# Patient Record
Sex: MALE | Race: WHITE | NOT HISPANIC OR LATINO | Employment: UNEMPLOYED | URBAN - METROPOLITAN AREA
[De-identification: names, ages, dates, MRNs, and addresses within clinical notes are randomized per-mention and may not be internally consistent; named-entity substitution may affect disease eponyms.]

---

## 2019-07-19 PROBLEM — F90.2 ATTENTION DEFICIT HYPERACTIVITY DISORDER (ADHD), COMBINED TYPE: Status: ACTIVE | Noted: 2017-05-16

## 2019-07-19 RX ORDER — DEXTROAMPHETAMINE SULFATE 10 MG/1
10 TABLET ORAL DAILY
Qty: 30 TABLET | Refills: 0 | Status: SHIPPED | OUTPATIENT
Start: 2019-07-19 | End: 2019-08-16 | Stop reason: SDUPTHER

## 2019-07-19 RX ORDER — DEXTROAMPHETAMINE SULFATE 10 MG/1
TABLET ORAL
COMMUNITY
Start: 2017-06-08 | End: 2019-07-19 | Stop reason: SDUPTHER

## 2019-08-18 RX ORDER — DEXTROAMPHETAMINE SULFATE 10 MG/1
10 TABLET ORAL DAILY
Qty: 30 TABLET | Refills: 0 | Status: SHIPPED | OUTPATIENT
Start: 2019-08-18 | End: 2019-08-28 | Stop reason: SDUPTHER

## 2019-08-28 ENCOUNTER — OFFICE VISIT (OUTPATIENT)
Dept: PSYCHIATRY | Facility: CLINIC | Age: 29
End: 2019-08-28

## 2019-08-28 DIAGNOSIS — F90.2 ATTENTION DEFICIT HYPERACTIVITY DISORDER (ADHD), COMBINED TYPE: Primary | ICD-10-CM

## 2019-08-28 PROCEDURE — 99212 OFFICE O/P EST SF 10 MIN: CPT | Performed by: PSYCHIATRY & NEUROLOGY

## 2019-08-28 RX ORDER — DEXTROAMPHETAMINE SULFATE 10 MG/1
10 TABLET ORAL DAILY
Qty: 30 TABLET | Refills: 0 | Status: SHIPPED | OUTPATIENT
Start: 2019-08-28 | End: 2019-10-14 | Stop reason: SDUPTHER

## 2019-08-28 NOTE — PROGRESS NOTES
Subjective   Kan Saavedra is a 29 y.o. male who presents today for follow up    Chief Complaint:  Decreased concentration without meds     History of Present Illness:   Long term issues with adhd, was stable on meds  On meds he is able to stay focused and finish the project,   meds last long enough   Mood is stable, denied feeling hopeless/helpless, denied AVH  No anxiety related to meds  He is taking not every day, able to get drug holidays     The following portions of the patient's history were reviewed and updated as appropriate: allergies, current medications, past family history, past medical history, past social history, past surgical history and problem list.  PAST PSYCHIATRIC HISTORY      Previous Psychiatric Diagnoses   Axis I: Attention Deficit Disorder     Past Hospitalizations or Residential Treatment   Locations\Providers: none      Past Outpatient Treatment   Diagnosis Treated: Affective Disorder, Anxiety/Panic Dis.  Treatment Type: Medication Management  Location: in school - psych      Prior Psychiatric Medications   Comments: ritalin since 2 grade, then taken off due to side effects      concerta - no difference      adderall - was rxd but did nto take it     viibryd - made numb      vyvanse was effective      Consequences of Mental Disorder   Consequences: family disruption, emotional distress     SOCIAL HISTORY   Single  Number of marriages: 0  Number of children: 1  Current Relationship is: unstable  Family of Origin is: supportive     Current Living Situation   Lives with: children, significant other     Education   Level: some college     Employment   Job Status: full-time     Hobbies and Leisure Activities   Activity Type: quiet activities     Alcohol use   Freq. drinking: <1  Smoking History   Smoking Hx: Current every day smoker  Pack per day: 1     Exercise   Exercise sessions (per wk): 2     Illicit Drug Use   Illicit Drugs used: none     FAMILY HISTORY OF MENTAL DISORDERS   fh  Grandparents: Non-contributory  fh Mother: Affective Disorders  fh Father: Non-contributory  fh Siblings: Non-contributory  fh Other: Non-contributory             Interval History  No Change    Side Effects  Denied       Past Medical History:  Past Medical History:   Diagnosis Date   • ADHD (attention deficit hyperactivity disorder)        Past Surgical History:  History reviewed. No pertinent surgical history.    Problem List:  Patient Active Problem List   Diagnosis   • Attention deficit hyperactivity disorder (ADHD), combined type       Allergy:   No Known Allergies     Discontinued Medications:  Medications Discontinued During This Encounter   Medication Reason   • dextroamphetamine (DEXTROSTAT) 10 MG tablet Reorder   • Lisdexamfetamine Dimesylate (VYVANSE) 60 MG chewable tablet Reorder       Current Medications:   Current Outpatient Medications   Medication Sig Dispense Refill   • dextroamphetamine (DEXTROSTAT) 10 MG tablet Take 1 tablet by mouth Daily. 30 tablet 0   • Lisdexamfetamine Dimesylate (VYVANSE) 60 MG chewable tablet Chew 60 mg Daily 30 tablet 0     No current facility-administered medications for this visit.          Review of Symptoms:    Psychiatric/Behavioral: Negative for agitation, behavioral problems, confusion, decreased concentration, dysphoric mood, hallucinations, self-injury, sleep disturbance and suicidal ideas. The patient is not nervous/anxious and is not hyperactive.        Physical Exam:   There were no vitals taken for this visit.    Mental Status Exam:   Hygiene:   good  Cooperation:  Cooperative  Eye Contact:  Good  Psychomotor Behavior:  Appropriate  Affect:  Full range  Mood: euthymic  Hopelessness: Denies  Speech:  Normal  Thought Process:  Goal directed and Linear  Thought Content:  Normal  Suicidal:  None  Homicidal:  None  Hallucinations:  None  Delusion:  None  Memory:  Intact  Orientation:  Person, Place, Time and Situation  Reliability:  good  Insight:  Good  Judgement:   Good  Impulse Control:  Fair  Physical/Medical Issues:  No        PHQ-9 Depression Screening  Little interest or pleasure in doing things? 1   Feeling down, depressed, or hopeless? 1   Trouble falling or staying asleep, or sleeping too much? 0   Feeling tired or having little energy? 2   Poor appetite or overeating? 0   Feeling bad about yourself - or that you are a failure or have let yourself or your family down? 1   Trouble concentrating on things, such as reading the newspaper or watching television? 2   Moving or speaking so slowly that other people could have noticed? Or the opposite - being so fidgety or restless that you have been moving around a lot more than usual? 0   Thoughts that you would be better off dead, or of hurting yourself in some way? 0   PHQ-9 Total Score 7   If you checked off any problems, how difficult have these problems made it for you to do your work, take care of things at home, or get along with other people? Very difficult           Former smoker    I advised Kan of the risks of tobacco use.     Lab Results:   No visits with results within 3 Month(s) from this visit.   Latest known visit with results is:   No results found for any previous visit.       Assessment/Plan   Problems Addressed this Visit        Other    Attention deficit hyperactivity disorder (ADHD), combined type - Primary    Relevant Medications    dextroamphetamine (DEXTROSTAT) 10 MG tablet    Lisdexamfetamine Dimesylate (VYVANSE) 60 MG chewable tablet          Visit Diagnoses:    ICD-10-CM ICD-9-CM   1. Attention deficit hyperactivity disorder (ADHD), combined type F90.2 314.01       TREATMENT PLAN/GOALS: Continue supportive psychotherapy efforts and medications as indicated. Treatment and medication options discussed during today's visit. Patient ackowledged and verbally consented to continue with current treatment plan and was educated on the importance of compliance with treatment and follow-up  appointments.    MEDICATION ISSUES:  Cont current meds    INSPECT reviewed as expected (last fill on 8/19/19)   Discussed medication options and treatment plan of prescribed medication as well as the risks, benefits, and side effects including potential falls, possible impaired driving and metabolic adversities among others. Patient is agreeable to call the office with any worsening of symptoms or onset of side effects. Patient is agreeable to call 911 or go to the nearest ER should he/she begin having SI/HI. No medication side effects or related complaints today.     MEDS ORDERED DURING VISIT:  New Medications Ordered This Visit   Medications   • dextroamphetamine (DEXTROSTAT) 10 MG tablet     Sig: Take 1 tablet by mouth Daily.     Dispense:  30 tablet     Refill:  0     Dispense on or after Sept 16, 2019   • Lisdexamfetamine Dimesylate (VYVANSE) 60 MG chewable tablet     Sig: Chew 60 mg Daily     Dispense:  30 tablet     Refill:  0     Please dispense on or after Sept 16,2019       Return in about 3 months (around 11/28/2019).         This document has been electronically signed by Hafsa Oneal MD  August 28, 2019 4:20 PM

## 2019-08-28 NOTE — PATIENT INSTRUCTIONS
Attention Deficit Hyperactivity Disorder, Adult  Attention deficit hyperactivity disorder (ADHD) is a mental health disorder that starts during childhood. For many people with ADHD, the disorder continues into adult years. There are many things that you and your health care provider or therapist (mental health professional) can do to manage your symptoms.  What are the causes?  The exact cause of ADHD is not known.  What increases the risk?  You are more likely to develop this condition if:  · You have a family history of ADHD.  · You are male.  · You were born to a mother who smoked or drank alcohol during pregnancy.  · You were exposed to lead poisoning or other toxins in the womb or in early life.  · You were born before 37 weeks of pregnancy (prematurely) or you had a low birth weight.  · You have experienced a brain injury.  What are the signs or symptoms?  Symptoms of this condition depend on the type of ADHD. The two main types are inattentive and hyperactive-impulsive. Some people may have symptoms of both types.  Symptoms of the inattentive type include:  · Difficulty watching, listening, or thinking with focused effort (paying attention).  · Making careless mistakes.  · Not listening.  · Not following instructions.  · Being disorganized.  · Avoiding tasks that require time and attention.  · Losing things.  · Forgetting things.  · Being easily distracted.  Symptoms of the hyperactive-impulsive type include:  · Restlessness.  · Talking too much.  · Interrupting.  · Difficulty with:  ? Sitting still.  ? Staying quiet.  ? Feeling motivated.  ? Relaxing.  ? Waiting in line or waiting for a turn.  How is this diagnosed?  This condition is diagnosed based on your current symptoms and your history of symptoms. The diagnosis can be made by a provider such as a primary care provider, psychiatrist, psychologist, or clinical . The provider may use a symptom checklist or a standardized behavior rating  scale to evaluate your symptoms. He or she may want to talk with family members who have known you for a long time and have observed your behaviors.  There are no lab tests or brain imaging tests that can diagnose ADHD.  How is this treated?  This condition can be treated with medicines and behavior therapy. Medicines may be the best option to reduce impulsive behaviors and improve attention. Your health care provider may recommend:  · Stimulant medicines. These are the most common medicines used for adult ADHD. They affect certain chemicals in the brain (neurotransmitters). These medicines may be long-acting or short-acting. This will determine how often you need to take the medicine.  · A non-stimulant medicine for adult ADHD (atomoxetine). This medicine increases a neurotransmitter called norepinephrine. It may take weeks to months to see effects from this medicine.  Psychotherapy and behavioral management are also important for treating ADHD. Psychotherapy is often used along with medicine. Your health care provider may suggest:  · Cognitive behavioral therapy (CBT). This type of therapy teaches you to replace negative thoughts and actions with positive thoughts and actions. When used as part of ADHD treatment, this therapy may also include:  ? Coping strategies for organization, time management, impulse control, and stress reduction.  ? Mindfulness and meditation training.  · Behavioral management. This may include strategies for organization and time management. You may work with an ADHD  who is specially trained to help people with ADHD to manage and organize activities and to function more effectively.  Follow these instructions at home:  Medicines    · Take over-the-counter and prescription medicines only as told by your health care provider.  · Talk with your health care provider about the possible side effects of your medicine to watch for.  General instructions    · Learn as much as you can about  adult ADHD, and work closely with your health care providers to find the treatments that work best for you.  · Do not use drugs or abuse alcohol. Limit alcohol intake to no more than 1 drink a day for nonpregnant women and 2 drinks a day for men. One drink equals 12 oz of beer, 5 oz of wine, or 1½ oz of hard liquor.  · Follow the same schedule each day. Make sure your schedule includes enough time for you to get plenty of sleep.  · Use reminder devices like notes, calendars, and phone apps to stay on-time and organized.  · Eat a healthy diet. Do not skip meals.  · Exercise regularly. Exercise can help to reduce stress and anxiety.  · Keep all follow-up visits as told by your health care provider and therapist. This is important.  Where to find more information  · A health care provider may be able to recommend resources that are available online or over the phone. You could start with:  ? Attention Deficit Disorder Association (ADDA): www.add.org  ? [employer] [place] [employer] (NIMH): www.nimh.nih.gov  Contact a health care provider if:  · Your symptoms are changing, getting worse, or not improving.  · You have side effects from your medicine, such as:  ? Repeated muscle twitches, coughing, or speech outbursts.  ? Sleep problems.  ? Loss of appetite.  ? Depression.  ? New or worsening behavior problems.  ? Dizziness.  ? Unusually fast heartbeat.  ? Stomach pains.  ? Headaches.  · You are struggling with anxiety, depression, or substance abuse.  Get help right away if:  · You have a severe reaction to a medicine.  · You have thoughts of hurting yourself or others.  If you ever feel like you may hurt yourself or others, or have thoughts about taking your own life, get help right away. You can go to the nearest emergency department or call:  · Your local emergency services (911 in the [place]  · A suicide crisis helpline, such as the National Suicide Prevention Lifeline at 1-153.868.8504. This is open 24 hours a  day.  Summary  · ADHD is a mental health disorder that starts during childhood and often continues into adult years.  · The exact cause of ADHD is not known.  · There is no cure for ADHD, but treatment with medicine, therapy, or behavioral training can help you manage your condition.  This information is not intended to replace advice given to you by your health care provider. Make sure you discuss any questions you have with your health care provider.  Document Released: 08/09/2018 Document Revised: 08/09/2018 Document Reviewed: 08/09/2018  [employer] Patient Education © 2019 [employer]

## 2019-10-14 DIAGNOSIS — F90.2 ATTENTION DEFICIT HYPERACTIVITY DISORDER (ADHD), COMBINED TYPE: ICD-10-CM

## 2019-10-15 RX ORDER — DEXTROAMPHETAMINE SULFATE 10 MG/1
10 TABLET ORAL DAILY
Qty: 30 TABLET | Refills: 0 | Status: SHIPPED | OUTPATIENT
Start: 2019-10-15 | End: 2019-11-12 | Stop reason: SDUPTHER

## 2019-11-12 DIAGNOSIS — F90.2 ATTENTION DEFICIT HYPERACTIVITY DISORDER (ADHD), COMBINED TYPE: ICD-10-CM

## 2019-11-13 RX ORDER — DEXTROAMPHETAMINE SULFATE 10 MG/1
10 TABLET ORAL DAILY
Qty: 30 TABLET | Refills: 0 | Status: SHIPPED | OUTPATIENT
Start: 2019-11-13 | End: 2019-12-03 | Stop reason: SDUPTHER

## 2019-12-03 ENCOUNTER — OFFICE VISIT (OUTPATIENT)
Dept: PSYCHIATRY | Facility: CLINIC | Age: 29
End: 2019-12-03

## 2019-12-03 DIAGNOSIS — F90.2 ATTENTION DEFICIT HYPERACTIVITY DISORDER (ADHD), COMBINED TYPE: Primary | ICD-10-CM

## 2019-12-03 PROCEDURE — 99213 OFFICE O/P EST LOW 20 MIN: CPT | Performed by: PSYCHIATRY & NEUROLOGY

## 2019-12-03 RX ORDER — DEXTROAMPHETAMINE SULFATE 10 MG/1
10 TABLET ORAL DAILY
Qty: 30 TABLET | Refills: 0 | Status: SHIPPED | OUTPATIENT
Start: 2019-12-03 | End: 2020-01-08 | Stop reason: SDUPTHER

## 2019-12-03 NOTE — PROGRESS NOTES
Subjective   Kan Saavedra is a 29 y.o. male who presents today for follow up    Chief Complaint:  Decreased concentration without meds      History of Present Illness:   The patient was diagnosed with ADHD when he was a child, he was stable on medications,  When on medications he is able to stay focused to finish the project   mood is stable, denied feeling hopeless/helpless, denied AVH  No anxiety related to meds  He is taking medications not every day, able to get drug holidays, did not report increased tolerance    The following portions of the patient's history were reviewed and updated as appropriate: allergies, current medications, past family history, past medical history, past social history, past surgical history and problem list.  PAST PSYCHIATRIC HISTORY      Previous Psychiatric Diagnoses   Axis I: Attention Deficit Disorder     Past Hospitalizations or Residential Treatment   Locations\Providers: none      Past Outpatient Treatment   Diagnosis Treated: Affective Disorder, Anxiety/Panic Dis.  Treatment Type: Medication Management  Location: in school - psych      Prior Psychiatric Medications   Comments: ritalin since 2 grade, then taken off due to side effects      concerta - no difference      adderall - was rxd but did nto take it     viibryd - made numb      vyvanse was effective      Consequences of Mental Disorder   Consequences: family disruption, emotional distress     SOCIAL HISTORY   Single  Number of marriages: 0  Number of children: 1  Current Relationship is: unstable  Family of Origin is: supportive     Current Living Situation   Lives with: children, significant other     Education   Level: some college     Employment   Job Status: full-time     Hobbies and Leisure Activities   Activity Type: quiet activities     Alcohol use   Freq. drinking: <1  Smoking History   Smoking Hx: Current every day smoker  Pack per day: 1     Exercise   Exercise sessions (per wk): 2     Illicit Drug Use    Illicit Drugs used: none     FAMILY HISTORY OF MENTAL DISORDERS   fh Grandparents: Non-contributory  fh Mother: Affective Disorders  fh Father: Non-contributory  fh Siblings: Non-contributory  fh Other: Non-contributory             Interval History  No Change, stable     Side Effects  Denied       Past Medical History:  Past Medical History:   Diagnosis Date   • ADHD (attention deficit hyperactivity disorder)        Past Surgical History:  History reviewed. No pertinent surgical history.    Problem List:  Patient Active Problem List   Diagnosis   • Attention deficit hyperactivity disorder (ADHD), combined type       Allergy:   No Known Allergies     Discontinued Medications:  There are no discontinued medications.    Current Medications:   Current Outpatient Medications   Medication Sig Dispense Refill   • dextroamphetamine (DEXTROSTAT) 10 MG tablet Take 1 tablet by mouth Daily. 30 tablet 0   • Lisdexamfetamine Dimesylate (VYVANSE) 60 MG chewable tablet Chew 60 mg Daily 30 tablet 0     No current facility-administered medications for this visit.          Review of Symptoms:    Psychiatric/Behavioral: Negative for agitation, behavioral problems, confusion, decreased concentration, dysphoric mood, hallucinations, self-injury, sleep disturbance and suicidal ideas.   The patient is not nervous/anxious and is not hyperactive.        Physical Exam:   There were no vitals taken for this visit.    Mental Status Exam:   Hygiene:   good  Cooperation:  Cooperative  Eye Contact:  Good  Psychomotor Behavior:  Appropriate  Affect:  Blunted congruent with mood   Mood: euthymic  Hopelessness: Denies  Speech:  Normal  Thought Process:  Goal directed and Linear  Thought Content:  Normal  Suicidal:  None  Homicidal:  None  Hallucinations:  None  Delusion:  None  Memory:  Intact  Orientation:  Person, Place, Time and Situation  Reliability:  good  Insight:  Good  Judgement:  Good  Impulse Control:  Fair  Physical/Medical Issues:  No       MSE reviewed and accepted with changes     PHQ-9 Depression Screening  Little interest or pleasure in doing things? 1   Feeling down, depressed, or hopeless? 1   Trouble falling or staying asleep, or sleeping too much? 0   Feeling tired or having little energy? 2   Poor appetite or overeating? 0   Feeling bad about yourself - or that you are a failure or have let yourself or your family down? 0   Trouble concentrating on things, such as reading the newspaper or watching television? 2   Moving or speaking so slowly that other people could have noticed? Or the opposite - being so fidgety or restless that you have been moving around a lot more than usual? 0   Thoughts that you would be better off dead, or of hurting yourself in some way? 0   PHQ-9 Total Score 6   If you checked off any problems, how difficult have these problems made it for you to do your work, take care of things at home, or get along with other people? Very difficult           Former smoker    I advised Kan of the risks of tobacco use.     Lab Results:   No visits with results within 3 Month(s) from this visit.   Latest known visit with results is:   No results found for any previous visit.       Assessment/Plan   Problems Addressed this Visit        Other    Attention deficit hyperactivity disorder (ADHD), combined type - Primary          Visit Diagnoses:    ICD-10-CM ICD-9-CM   1. Attention deficit hyperactivity disorder (ADHD), combined type F90.2 314.01       TREATMENT PLAN/GOALS: Continue supportive psychotherapy efforts and medications as indicated. Treatment and medication options discussed during today's visit. Patient ackowledged and verbally consented to continue with current treatment plan and was educated on the importance of compliance with treatment and follow-up appointments.    MEDICATION ISSUES:  Cont current meds, stable on meds, no side effects , weight stable     INSPECT reviewed as expected  - last filled on 11/13/19    Discussed medication options and treatment plan of prescribed medication as well as the risks, benefits, and side effects including potential falls, possible impaired driving and metabolic adversities among others. Patient is agreeable to call the office with any worsening of symptoms or onset of side effects. Patient is agreeable to call 911 or go to the nearest ER should he/she begin having SI/HI. No medication side effects or related complaints today.     MEDS ORDERED DURING VISIT:  No orders of the defined types were placed in this encounter.      Return in about 4 years (around 12/3/2023).         This document has been electronically signed by Hafsa Oneal MD  December 3, 2019 4:14 PM

## 2019-12-03 NOTE — PATIENT INSTRUCTIONS
Attention Deficit Hyperactivity Disorder, Adult  Attention deficit hyperactivity disorder (ADHD) is a mental health disorder that starts during childhood. For many people with ADHD, the disorder continues into adult years. There are many things that you and your health care provider or therapist (mental health professional) can do to manage your symptoms.  What are the causes?  The exact cause of ADHD is not known.  What increases the risk?  You are more likely to develop this condition if:  · You have a family history of ADHD.  · You are male.  · You were born to a mother who smoked or drank alcohol during pregnancy.  · You were exposed to lead poisoning or other toxins in the womb or in early life.  · You were born before 37 weeks of pregnancy (prematurely) or you had a low birth weight.  · You have experienced a brain injury.  What are the signs or symptoms?  Symptoms of this condition depend on the type of ADHD. The two main types are inattentive and hyperactive-impulsive. Some people may have symptoms of both types.  Symptoms of the inattentive type include:  · Difficulty watching, listening, or thinking with focused effort (paying attention).  · Making careless mistakes.  · Not listening.  · Not following instructions.  · Being disorganized.  · Avoiding tasks that require time and attention.  · Losing things.  · Forgetting things.  · Being easily distracted.  Symptoms of the hyperactive-impulsive type include:  · Restlessness.  · Talking too much.  · Interrupting.  · Difficulty with:  ? Sitting still.  ? Staying quiet.  ? Feeling motivated.  ? Relaxing.  ? Waiting in line or waiting for a turn.  How is this diagnosed?  This condition is diagnosed based on your current symptoms and your history of symptoms. The diagnosis can be made by a provider such as a primary care provider, psychiatrist, psychologist, or clinical . The provider may use a symptom checklist or a standardized behavior rating  scale to evaluate your symptoms. He or she may want to talk with family members who have known you for a long time and have observed your behaviors.  There are no lab tests or brain imaging tests that can diagnose ADHD.  How is this treated?  This condition can be treated with medicines and behavior therapy. Medicines may be the best option to reduce impulsive behaviors and improve attention. Your health care provider may recommend:  · Stimulant medicines. These are the most common medicines used for adult ADHD. They affect certain chemicals in the brain (neurotransmitters). These medicines may be long-acting or short-acting. This will determine how often you need to take the medicine.  · A non-stimulant medicine for adult ADHD (atomoxetine). This medicine increases a neurotransmitter called norepinephrine. It may take weeks to months to see effects from this medicine.  Psychotherapy and behavioral management are also important for treating ADHD. Psychotherapy is often used along with medicine. Your health care provider may suggest:  · Cognitive behavioral therapy (CBT). This type of therapy teaches you to replace negative thoughts and actions with positive thoughts and actions. When used as part of ADHD treatment, this therapy may also include:  ? Coping strategies for organization, time management, impulse control, and stress reduction.  ? Mindfulness and meditation training.  · Behavioral management. This may include strategies for organization and time management. You may work with an ADHD  who is specially trained to help people with ADHD to manage and organize activities and to function more effectively.  Follow these instructions at home:  Medicines    · Take over-the-counter and prescription medicines only as told by your health care provider.  · Talk with your health care provider about the possible side effects of your medicine to watch for.  General instructions    · Learn as much as you can about  adult ADHD, and work closely with your health care providers to find the treatments that work best for you.  · Do not use drugs or abuse alcohol. Limit alcohol intake to no more than 1 drink a day for nonpregnant women and 2 drinks a day for men. One drink equals 12 oz of beer, 5 oz of wine, or 1½ oz of hard liquor.  · Follow the same schedule each day. Make sure your schedule includes enough time for you to get plenty of sleep.  · Use reminder devices like notes, calendars, and phone apps to stay on-time and organized.  · Eat a healthy diet. Do not skip meals.  · Exercise regularly. Exercise can help to reduce stress and anxiety.  · Keep all follow-up visits as told by your health care provider and therapist. This is important.  Where to find more information  · A health care provider may be able to recommend resources that are available online or over the phone. You could start with:  ? Attention Deficit Disorder Association (ADDA): www.add.org  ? [place] of Mental Health (NIMH): www.nimh.nih.gov  Contact a health care provider if:  · Your symptoms are changing, getting worse, or not improving.  · You have side effects from your medicine, such as:  ? Repeated muscle twitches, coughing, or speech outbursts.  ? Sleep problems.  ? Loss of appetite.  ? Depression.  ? New or worsening behavior problems.  ? Dizziness.  ? Unusually fast heartbeat.  ? Stomach pains.  ? Headaches.  · You are struggling with anxiety, depression, or substance abuse.  Get help right away if:  · You have a severe reaction to a medicine.  · You have thoughts of hurting yourself or others.  If you ever feel like you may hurt yourself or others, or have thoughts about taking your own life, get help right away. You can go to the nearest emergency department or call:  · Your local emergency services (911 in the [place]  · A suicide crisis helpline, such as the National Suicide Prevention Lifeline at 1-885.115.2632. This is open 24 hours a  day.  Summary  · ADHD is a mental health disorder that starts during childhood and often continues into adult years.  · The exact cause of ADHD is not known.  · There is no cure for ADHD, but treatment with medicine, therapy, or behavioral training can help you manage your condition.  This information is not intended to replace advice given to you by your health care provider. Make sure you discuss any questions you have with your health care provider.  Document Released: 08/09/2018 Document Revised: 08/09/2018 Document Reviewed: 08/09/2018  [employer] Interactive Patient Education © 2019 [employer]

## 2020-01-08 DIAGNOSIS — F90.2 ATTENTION DEFICIT HYPERACTIVITY DISORDER (ADHD), COMBINED TYPE: ICD-10-CM

## 2020-01-08 RX ORDER — DEXTROAMPHETAMINE SULFATE 10 MG/1
10 TABLET ORAL DAILY
Qty: 30 TABLET | Refills: 0 | Status: SHIPPED | OUTPATIENT
Start: 2020-01-08 | End: 2020-01-17

## 2020-01-09 ENCOUNTER — TELEPHONE (OUTPATIENT)
Dept: PSYCHIATRY | Facility: CLINIC | Age: 30
End: 2020-01-09

## 2020-01-16 ENCOUNTER — TELEPHONE (OUTPATIENT)
Dept: PSYCHIATRY | Facility: CLINIC | Age: 30
End: 2020-01-16

## 2020-01-16 DIAGNOSIS — F90.2 ATTENTION DEFICIT HYPERACTIVITY DISORDER (ADHD), COMBINED TYPE: Primary | ICD-10-CM

## 2020-01-16 NOTE — TELEPHONE ENCOUNTER
PATIENT IS REQUESTING MED CHANGE WITH ADHD MED. NEW INSURANCE WILL NOT PAY FOR VYVANSE.HE IS AFRAID TO WAIT ON APPEAL  PLEASE ADVISE

## 2020-01-17 RX ORDER — DEXTROAMPHETAMINE SACCHARATE, AMPHETAMINE ASPARTATE MONOHYDRATE, DEXTROAMPHETAMINE SULFATE AND AMPHETAMINE SULFATE 5; 5; 5; 5 MG/1; MG/1; MG/1; MG/1
CAPSULE, EXTENDED RELEASE ORAL
Qty: 30 CAPSULE | Refills: 0 | Status: SHIPPED | OUTPATIENT
Start: 2020-01-17 | End: 2020-01-30 | Stop reason: SDUPTHER

## 2020-01-20 ENCOUNTER — TELEPHONE (OUTPATIENT)
Dept: PSYCHIATRY | Facility: CLINIC | Age: 30
End: 2020-01-20

## 2020-01-20 NOTE — TELEPHONE ENCOUNTER
NOTIFIED PHARMACY OF CLARIFICATION ON ADDERALL XR BID WAS SENT IN FOR #30 TO MAKE SURE IT WORKS OK FOR PATIENT

## 2020-01-30 DIAGNOSIS — F90.2 ATTENTION DEFICIT HYPERACTIVITY DISORDER (ADHD), COMBINED TYPE: ICD-10-CM

## 2020-01-31 RX ORDER — DEXTROAMPHETAMINE SACCHARATE, AMPHETAMINE ASPARTATE MONOHYDRATE, DEXTROAMPHETAMINE SULFATE AND AMPHETAMINE SULFATE 5; 5; 5; 5 MG/1; MG/1; MG/1; MG/1
CAPSULE, EXTENDED RELEASE ORAL
Qty: 60 CAPSULE | Refills: 0 | Status: SHIPPED | OUTPATIENT
Start: 2020-01-31 | End: 2020-02-27 | Stop reason: SDUPTHER

## 2020-02-12 ENCOUNTER — TELEPHONE (OUTPATIENT)
Dept: PSYCHIATRY | Facility: CLINIC | Age: 30
End: 2020-02-12

## 2020-02-12 DIAGNOSIS — F90.2 ATTENTION DEFICIT HYPERACTIVITY DISORDER (ADHD), COMBINED TYPE: ICD-10-CM

## 2020-02-12 NOTE — TELEPHONE ENCOUNTER
Patient called regarding amphetamine script needed. The patient said he was taking Vyvanse 60 mg daily and dextroamphetamine 10 mg daily, but his insurance wouldn't cover the Vyvance,so you changed him to Amphetamine - Dextroamphetamine 20 mg in place of the Vyvanse. He said he should still be amphetamine sulfate 10 mg and needs a script sent to his pharmacy. Please advise

## 2020-02-13 NOTE — TELEPHONE ENCOUNTER
He had vyvanse (which is extended release) and adderal immediate release. When he was switched to adderall I put adderall extended release 2 times per day , so he should have longer coverage now and does not need immediate release. Adderall XR in the AM replaced vyvanse and adderall XR in the afternoon replaced immediate release. He has 60 caps per month

## 2020-02-18 NOTE — TELEPHONE ENCOUNTER
Patient notified of MD recommendation and if the medication adjustment given  wasn't sufficient he should contact the office 3 to 4 days prior to his last dose to  request another medication  adjustment

## 2020-02-27 DIAGNOSIS — F90.2 ATTENTION DEFICIT HYPERACTIVITY DISORDER (ADHD), COMBINED TYPE: ICD-10-CM

## 2020-02-27 NOTE — TELEPHONE ENCOUNTER
PATIENT CALLED AND REQ REFILL ON HIS ADDERALL XR BUT HE MENTIONED YOU WERE GOING TO UP HIS MG TO 30 DUE TO HIM NOT COMING FOR AN APPT FOR A WHILE. THANKS

## 2020-02-28 RX ORDER — DEXTROAMPHETAMINE SACCHARATE, AMPHETAMINE ASPARTATE MONOHYDRATE, DEXTROAMPHETAMINE SULFATE AND AMPHETAMINE SULFATE 6.25; 6.25; 6.25; 6.25 MG/1; MG/1; MG/1; MG/1
CAPSULE, EXTENDED RELEASE ORAL
Qty: 60 CAPSULE | Refills: 0 | Status: SHIPPED | OUTPATIENT
Start: 2020-02-28 | End: 2020-03-26

## 2020-03-25 DIAGNOSIS — F90.2 ATTENTION DEFICIT HYPERACTIVITY DISORDER (ADHD), COMBINED TYPE: ICD-10-CM

## 2020-03-25 NOTE — TELEPHONE ENCOUNTER
PATIENT CALLED TO REQ REFILL  FOR HIS ADDERAL  XR 25 MG. PATIENT IS ASKING FOR A INCREASE IN DOSAGE.    CONFIRMED THE PHARMACY IS [address]

## 2020-03-26 DIAGNOSIS — F90.2 ATTENTION DEFICIT HYPERACTIVITY DISORDER (ADHD), COMBINED TYPE: Primary | ICD-10-CM

## 2020-03-26 RX ORDER — DEXTROAMPHETAMINE SACCHARATE, AMPHETAMINE ASPARTATE MONOHYDRATE, DEXTROAMPHETAMINE SULFATE AND AMPHETAMINE SULFATE 7.5; 7.5; 7.5; 7.5 MG/1; MG/1; MG/1; MG/1
CAPSULE, EXTENDED RELEASE ORAL
Qty: 30 CAPSULE | Refills: 0 | Status: SHIPPED | OUTPATIENT
Start: 2020-03-26 | End: 2020-04-10 | Stop reason: SDUPTHER

## 2020-03-26 RX ORDER — DEXTROAMPHETAMINE SACCHARATE, AMPHETAMINE ASPARTATE MONOHYDRATE, DEXTROAMPHETAMINE SULFATE AND AMPHETAMINE SULFATE 6.25; 6.25; 6.25; 6.25 MG/1; MG/1; MG/1; MG/1
CAPSULE, EXTENDED RELEASE ORAL
Qty: 60 CAPSULE | Refills: 0 | OUTPATIENT
Start: 2020-03-26

## 2020-04-09 ENCOUNTER — TELEPHONE (OUTPATIENT)
Dept: PSYCHIATRY | Facility: CLINIC | Age: 30
End: 2020-04-09

## 2020-04-09 DIAGNOSIS — F90.2 ATTENTION DEFICIT HYPERACTIVITY DISORDER (ADHD), COMBINED TYPE: ICD-10-CM

## 2020-04-10 RX ORDER — DEXTROAMPHETAMINE SACCHARATE, AMPHETAMINE ASPARTATE MONOHYDRATE, DEXTROAMPHETAMINE SULFATE AND AMPHETAMINE SULFATE 7.5; 7.5; 7.5; 7.5 MG/1; MG/1; MG/1; MG/1
CAPSULE, EXTENDED RELEASE ORAL
Qty: 60 CAPSULE | Refills: 0 | Status: SHIPPED | OUTPATIENT
Start: 2020-04-10 | End: 2020-05-08 | Stop reason: SDUPTHER

## 2020-05-08 DIAGNOSIS — F90.2 ATTENTION DEFICIT HYPERACTIVITY DISORDER (ADHD), COMBINED TYPE: ICD-10-CM

## 2020-05-08 RX ORDER — DEXTROAMPHETAMINE SACCHARATE, AMPHETAMINE ASPARTATE MONOHYDRATE, DEXTROAMPHETAMINE SULFATE AND AMPHETAMINE SULFATE 7.5; 7.5; 7.5; 7.5 MG/1; MG/1; MG/1; MG/1
CAPSULE, EXTENDED RELEASE ORAL
Qty: 60 CAPSULE | Refills: 0 | Status: SHIPPED | OUTPATIENT
Start: 2020-05-08 | End: 2020-06-05 | Stop reason: SDUPTHER

## 2020-06-05 DIAGNOSIS — F90.2 ATTENTION DEFICIT HYPERACTIVITY DISORDER (ADHD), COMBINED TYPE: ICD-10-CM

## 2020-06-09 RX ORDER — DEXTROAMPHETAMINE SACCHARATE, AMPHETAMINE ASPARTATE MONOHYDRATE, DEXTROAMPHETAMINE SULFATE AND AMPHETAMINE SULFATE 7.5; 7.5; 7.5; 7.5 MG/1; MG/1; MG/1; MG/1
CAPSULE, EXTENDED RELEASE ORAL
Qty: 60 CAPSULE | Refills: 0 | Status: SHIPPED | OUTPATIENT
Start: 2020-06-09 | End: 2020-07-03 | Stop reason: SDUPTHER

## 2020-07-03 DIAGNOSIS — F90.2 ATTENTION DEFICIT HYPERACTIVITY DISORDER (ADHD), COMBINED TYPE: ICD-10-CM

## 2020-07-06 RX ORDER — DEXTROAMPHETAMINE SACCHARATE, AMPHETAMINE ASPARTATE MONOHYDRATE, DEXTROAMPHETAMINE SULFATE AND AMPHETAMINE SULFATE 7.5; 7.5; 7.5; 7.5 MG/1; MG/1; MG/1; MG/1
CAPSULE, EXTENDED RELEASE ORAL
Qty: 60 CAPSULE | Refills: 0 | Status: SHIPPED | OUTPATIENT
Start: 2020-07-06 | End: 2020-08-04 | Stop reason: SDUPTHER

## 2020-08-04 DIAGNOSIS — F90.2 ATTENTION DEFICIT HYPERACTIVITY DISORDER (ADHD), COMBINED TYPE: ICD-10-CM

## 2020-08-04 RX ORDER — DEXTROAMPHETAMINE SACCHARATE, AMPHETAMINE ASPARTATE MONOHYDRATE, DEXTROAMPHETAMINE SULFATE AND AMPHETAMINE SULFATE 7.5; 7.5; 7.5; 7.5 MG/1; MG/1; MG/1; MG/1
CAPSULE, EXTENDED RELEASE ORAL
Qty: 60 CAPSULE | Refills: 0 | Status: SHIPPED | OUTPATIENT
Start: 2020-08-04 | End: 2020-09-03 | Stop reason: SDUPTHER

## 2020-09-03 DIAGNOSIS — F90.2 ATTENTION DEFICIT HYPERACTIVITY DISORDER (ADHD), COMBINED TYPE: ICD-10-CM

## 2020-09-04 RX ORDER — DEXTROAMPHETAMINE SACCHARATE, AMPHETAMINE ASPARTATE MONOHYDRATE, DEXTROAMPHETAMINE SULFATE AND AMPHETAMINE SULFATE 7.5; 7.5; 7.5; 7.5 MG/1; MG/1; MG/1; MG/1
CAPSULE, EXTENDED RELEASE ORAL
Qty: 60 CAPSULE | Refills: 0 | Status: SHIPPED | OUTPATIENT
Start: 2020-09-04 | End: 2020-10-01 | Stop reason: SDUPTHER

## 2020-10-01 DIAGNOSIS — F90.2 ATTENTION DEFICIT HYPERACTIVITY DISORDER (ADHD), COMBINED TYPE: ICD-10-CM

## 2020-10-01 RX ORDER — DEXTROAMPHETAMINE SACCHARATE, AMPHETAMINE ASPARTATE MONOHYDRATE, DEXTROAMPHETAMINE SULFATE AND AMPHETAMINE SULFATE 7.5; 7.5; 7.5; 7.5 MG/1; MG/1; MG/1; MG/1
CAPSULE, EXTENDED RELEASE ORAL
Qty: 60 CAPSULE | Refills: 0 | Status: SHIPPED | OUTPATIENT
Start: 2020-10-01 | End: 2020-10-06 | Stop reason: SDUPTHER

## 2020-10-06 ENCOUNTER — OFFICE VISIT (OUTPATIENT)
Dept: PSYCHIATRY | Facility: CLINIC | Age: 30
End: 2020-10-06

## 2020-10-06 DIAGNOSIS — F90.2 ATTENTION DEFICIT HYPERACTIVITY DISORDER (ADHD), COMBINED TYPE: ICD-10-CM

## 2020-10-06 PROCEDURE — 99213 OFFICE O/P EST LOW 20 MIN: CPT | Performed by: PSYCHIATRY & NEUROLOGY

## 2020-10-06 RX ORDER — DEXTROAMPHETAMINE SACCHARATE, AMPHETAMINE ASPARTATE MONOHYDRATE, DEXTROAMPHETAMINE SULFATE AND AMPHETAMINE SULFATE 7.5; 7.5; 7.5; 7.5 MG/1; MG/1; MG/1; MG/1
CAPSULE, EXTENDED RELEASE ORAL
Qty: 60 CAPSULE | Refills: 0 | Status: SHIPPED | OUTPATIENT
Start: 2020-10-06 | End: 2020-11-03 | Stop reason: SDUPTHER

## 2020-10-06 NOTE — PATIENT INSTRUCTIONS
Attention Deficit Hyperactivity Disorder, Adult  Attention deficit hyperactivity disorder (ADHD) is a mental health disorder that starts during childhood (neurodevelopmental disorder). For many people with ADHD, the disorder continues into the adult years. Treatment can help you manage your symptoms.  What are the causes?  The exact cause of ADHD is not known. Most experts believe genetics and environmental factors contribute to ADHD.  What increases the risk?  The following factors may make you more likely to develop this condition:  · Having a family history of ADHD.  · Being male.  · Being born to a mother who smoked or drank alcohol during pregnancy.  · Being exposed to lead or other toxins in the womb or early in life.  · Being born before 37 weeks of pregnancy (prematurely) or at a low birth weight.  · Having experienced a brain injury.  What are the signs or symptoms?  Symptoms of this condition depend on the type of ADHD. The two main types are inattentive and hyperactive-impulsive. Some people may have symptoms of both types.  Symptoms of the inattentive type include:  · Difficulty paying attention.  · Making careless mistakes.  · Not following instructions.  · Being disorganized.  · Avoiding tasks that require time and attention.  · Losing and forgetting things.  · Being easily distracted.  Symptoms of the hyperactive-impulsive type include:  · Restlessness.  · Talking too much.  · Interrupting.  · Difficulty with:  ? Sitting still.  ? Feeling motivated.  ? Relaxing.  ? Waiting in line or waiting for a turn.  In adults, this condition may lead to certain problems, such as:  · Keeping jobs.  · Performing tasks at work.  · Having stable relationships.  · Being on time or keeping to a schedule.  How is this diagnosed?  This condition is diagnosed based on your current symptoms and your history of symptoms. The diagnosis can be made by a health care provider such as a primary care provider or a mental health  care specialist.  Your health care provider may use a symptom checklist or a behavior rating scale to evaluate your symptoms. He or she may also want to talk with people who have observed your behaviors throughout your life.  How is this treated?  This condition can be treated with medicines and behavior therapy. Medicines may be the best option to reduce impulsive behaviors and improve attention. Your health care provider may recommend:  · Stimulant medicines. These are the most common medicines used for adult ADHD. They affect certain chemicals in the brain (neurotransmitters) and improve your ability to control your symptoms.  · A non-stimulant medicine for adult ADHD (atomoxetine). This medicine increases a neurotransmitter called norepinephrine. It may take weeks to months to see effects from this medicine.  Counseling and behavioral management are also important for treating ADHD. Counseling is often used along with medicine. Your health care provider may suggest:  · Cognitive behavioral therapy (CBT). This type of therapy teaches you to replace negative thoughts and actions with positive thoughts and actions. When used as part of ADHD treatment, this therapy may also include:  ? Coping strategies for organization, time management, impulse control, and stress reduction.  ? Mindfulness and meditation training.  · Behavioral management. You may work with a  who is specially trained to help people with ADHD manage and organize activities and function more effectively.  Follow these instructions at home:  Medicines    · Take over-the-counter and prescription medicines only as told by your health care provider.  · Talk with your health care provider about the possible side effects of your medicines and how to manage them.  Lifestyle    · Do not use drugs.  · Do not drink alcohol if:  ? Your health care provider tells you not to drink.  ? You are pregnant, may be pregnant, or are planning to become  pregnant.  · If you drink alcohol:  ? Limit how much you use to:  § 0-1 drink a day for women.  § 0-2 drinks a day for men.  ? Be aware of how much alcohol is in your drink. In the [place] one drink equals one 12 oz bottle of beer (355 mL), one 5 oz glass of wine (148 mL), or one 1½ oz glass of hard liquor (44 mL).  · Get enough sleep.  · Eat a healthy diet.  · Exercise regularly. Exercise can help to reduce stress and anxiety.  General instructions  · Learn as much as you can about adult ADHD, and work closely with your health care providers to find the treatments that work best for you.  · Follow the same schedule each day.  · Use reminder devices like notes, calendars, and phone apps to stay on time and organized.  · Keep all follow-up visits as told by your health care provider and therapist. This is important.  Where to find more information  A health care provider may be able to recommend resources that are available online or over the phone. You could start with:  · Attention Deficit Disorder Association (ADDA): www.add.org  · National [place] of Mental Health (NIM): www.nimh.nih.gov  Contact a health care provider if:  · Your symptoms continue to cause problems.  · You have side effects from your medicine, such as:  ? Repeated muscle twitches, coughing, or speech outbursts.  ? Sleep problems.  ? Loss of appetite.  ? Dizziness.  ? Unusually fast heartbeat.  ? Stomach pains.  ? Headaches.  · You are struggling with anxiety, depression, or substance abuse.  Get help right away if you:  · Have a severe reaction to a medicine.  If you ever feel like you may hurt yourself or others, or have thoughts about taking your own life, get help right away. You can go to the nearest emergency department or call:  · Your local emergency services (911 in the [place]  · A suicide crisis helpline, such as the National Suicide Prevention Lifeline at 1-234.484.5156. This is open 24 hours a day.  Summary  · ADHD is a mental health  disorder that starts during childhood (neurodevelopmental disorder) and often continues into the adult years.  · The exact cause of ADHD is not known. Most experts believe genetics and environmental factors contribute to ADHD.  · There is no cure for ADHD, but treatment with medicine, cognitive behavioral therapy, or behavioral management can help you manage your condition.  This information is not intended to replace advice given to you by your health care provider. Make sure you discuss any questions you have with your health care provider.  Document Released: 08/09/2018 Document Revised: 05/11/2020 Document Reviewed: 05/11/2020  [employer]vier Patient Education © 2020 [employer] Inc.

## 2020-10-06 NOTE — PROGRESS NOTES
Subjective   Kan Saavedra is a 30 y.o. male who presents today for follow up via phone  You have chosen to receive care through a telephone visit. Do you consent to use a telephone visit for your medical care today? Yes    Chief Complaint:  To f/u on decreased concentration , to refill meds to remain sability      History of Present Illness:   The patient was diagnosed with ADHD when he was a child, he was stable on medications,  When on medications he is able to stay focused to finish the project   mood is stable, denied feeling hopeless/helpless, denied AVH  No anxiety related to meds  Today the pt reported feeling stable, taking meds for work , on adderall XR 2 times per day , when on meds - concentration is better , more organized, more productive     He is taking medications not every day, able to get drug holidays, did not report increased tolerance    The following portions of the patient's history were reviewed and updated as appropriate: allergies, current medications, past family history, past medical history, past social history, past surgical history and problem list.  PAST PSYCHIATRIC HISTORY      Previous Psychiatric Diagnoses   Axis I: Attention Deficit Disorder     Past Hospitalizations or Residential Treatment   Locations\Providers: none      Past Outpatient Treatment   Diagnosis Treated: Affective Disorder, Anxiety/Panic Dis.  Treatment Type: Medication Management  Location: in school - psych      Prior Psychiatric Medications   Comments: ritalin since 2 grade, then taken off due to side effects      concerta - no difference      adderall - was rxd but did nto take it     viibryd - made numb      vyvanse was effective      Consequences of Mental Disorder   Consequences: family disruption, emotional distress     SOCIAL HISTORY   Single  Number of marriages: 0  Number of children: 1  Current Relationship is: unstable  Family of Origin is: supportive     Current Living Situation   Lives with:  children, significant other     Education   Level: some college     Employment   Job Status: full-time     Hobbies and Leisure Activities   Activity Type: quiet activities     Alcohol use   Freq. drinking: <1  Smoking History   Smoking Hx: Current every day smoker  Pack per day: 1     Exercise   Exercise sessions (per wk): 2     Illicit Drug Use   Illicit Drugs used: none     FAMILY HISTORY OF MENTAL DISORDERS   fh Grandparents: Non-contributory  fh Mother: Affective Disorders  fh Father: Non-contributory  fh Siblings: Non-contributory  fh Other: Non-contributory             Interval History  No Change, stable     Side Effects  Denied       Past Medical History:  Past Medical History:   Diagnosis Date   • ADHD (attention deficit hyperactivity disorder)        Past Surgical History:  History reviewed. No pertinent surgical history.    Problem List:  Patient Active Problem List   Diagnosis   • Attention deficit hyperactivity disorder (ADHD), combined type       Allergy:   No Known Allergies     Discontinued Medications:  Medications Discontinued During This Encounter   Medication Reason   • amphetamine-dextroamphetamine XR (ADDERALL XR) 30 MG 24 hr capsule Reorder       Current Medications:   Current Outpatient Medications   Medication Sig Dispense Refill   • amphetamine-dextroamphetamine XR (ADDERALL XR) 30 MG 24 hr capsule 1 cap po at 8 AM and 3 PM 60 capsule 0     No current facility-administered medications for this visit.          Review of Symptoms:    Psychiatric/Behavioral: Negative for agitation, behavioral problems, confusion, decreased concentration, dysphoric mood, hallucinations, self-injury, sleep disturbance and suicidal ideas.   The patient is not depressed, not nervous/anxious and is not hyperactive.        Physical Exam:   There were no vitals taken for this visit.    Mental Status Exam:   Hygiene:   unable to assess due to phone visit   Cooperation:  Cooperative  Eye Contact:  unable to assess due  to phone visit   Psychomotor Behavior:  Appropriate  Affect:  Blunted congruent with mood   Mood: euthymic  Hopelessness: Denies  Speech:  Normal  Thought Process:  Goal directed and Linear  Thought Content:  Normal  Suicidal:  None  Homicidal:  None  Hallucinations:  None  Delusion:  None  Memory:  Intact  Orientation:  Person, Place, Time and Situation  Reliability:  good  Insight:  Good  Judgement:  Good  Impulse Control:  Fair  Physical/Medical Issues:  No      MSE from 12/3/19  reviewed and accepted with changes     PHQ-9 Depression Screening  Little interest or pleasure in doing things? 1   Feeling down, depressed, or hopeless? 1   Trouble falling or staying asleep, or sleeping too much? 0   Feeling tired or having little energy? 1   Poor appetite or overeating? 0   Feeling bad about yourself - or that you are a failure or have let yourself or your family down? 1   Trouble concentrating on things, such as reading the newspaper or watching television? 2   Moving or speaking so slowly that other people could have noticed? Or the opposite - being so fidgety or restless that you have been moving around a lot more than usual? 0   Thoughts that you would be better off dead, or of hurting yourself in some way? 0   PHQ-9 Total Score 6   If you checked off any problems, how difficult have these problems made it for you to do your work, take care of things at home, or get along with other people? Very difficult           Former smoker    I advised Kan of the risks of tobacco use.     Lab Results:   No visits with results within 3 Month(s) from this visit.   Latest known visit with results is:   No results found for any previous visit.       Assessment/Plan   Problems Addressed this Visit        Other    Attention deficit hyperactivity disorder (ADHD), combined type    Relevant Medications    amphetamine-dextroamphetamine XR (ADDERALL XR) 30 MG 24 hr capsule      Diagnoses       Codes Comments    Attention deficit  hyperactivity disorder (ADHD), combined type     ICD-10-CM: F90.2  ICD-9-CM: 314.01           Visit Diagnoses:    ICD-10-CM ICD-9-CM   1. Attention deficit hyperactivity disorder (ADHD), combined type  F90.2 314.01       TREATMENT PLAN/GOALS: Continue supportive psychotherapy efforts and medications as indicated. Treatment and medication options discussed during today's visit. Patient ackowledged and verbally consented to continue with current treatment plan and was educated on the importance of compliance with treatment and follow-up appointments.    MEDICATION ISSUES:  Cont current meds, stable on meds, no side effects , weight stable     UDS was + for kratom, the pt stated he was using it occasionally to alleviate aches and pains , will repeat UDS, it was explained that kratom has potential for addiction and illegal in [place]   INSPECT reviewed as expected  - last filled on 2020      Discussed medication options and treatment plan of prescribed medication as well as the risks, benefits, and side effects including potential falls, possible impaired driving and metabolic adversities among others. Patient is agreeable to call the office with any worsening of symptoms or onset of side effects. Patient is agreeable to call 911 or go to the nearest ER should he/she begin having SI/HI. No medication side effects or related complaints today.     MEDS ORDERED DURING VISIT:  New Medications Ordered This Visit   Medications   • amphetamine-dextroamphetamine XR (ADDERALL XR) 30 MG 24 hr capsule     Si cap po at 8 AM and 3 PM     Dispense:  60 capsule     Refill:  0       Return in about 3 months (around 2021).      This visit has been rescheduled as a phone visit to comply with patient safety concerns in accordance with CDC recommendations. Total time of discussion was 15 minutes.    This document has been electronically signed by Hafsa Oneal MD  2020 08:12 EDT

## 2020-11-03 DIAGNOSIS — F90.2 ATTENTION DEFICIT HYPERACTIVITY DISORDER (ADHD), COMBINED TYPE: ICD-10-CM

## 2020-11-04 RX ORDER — DEXTROAMPHETAMINE SACCHARATE, AMPHETAMINE ASPARTATE MONOHYDRATE, DEXTROAMPHETAMINE SULFATE AND AMPHETAMINE SULFATE 7.5; 7.5; 7.5; 7.5 MG/1; MG/1; MG/1; MG/1
CAPSULE, EXTENDED RELEASE ORAL
Qty: 60 CAPSULE | Refills: 0 | Status: SHIPPED | OUTPATIENT
Start: 2020-11-04 | End: 2020-12-02 | Stop reason: SDUPTHER

## 2020-12-02 DIAGNOSIS — F90.2 ATTENTION DEFICIT HYPERACTIVITY DISORDER (ADHD), COMBINED TYPE: ICD-10-CM

## 2020-12-02 RX ORDER — DEXTROAMPHETAMINE SACCHARATE, AMPHETAMINE ASPARTATE MONOHYDRATE, DEXTROAMPHETAMINE SULFATE AND AMPHETAMINE SULFATE 7.5; 7.5; 7.5; 7.5 MG/1; MG/1; MG/1; MG/1
CAPSULE, EXTENDED RELEASE ORAL
Qty: 60 CAPSULE | Refills: 0 | Status: SHIPPED | OUTPATIENT
Start: 2020-12-02 | End: 2020-12-29 | Stop reason: SDUPTHER

## 2020-12-29 DIAGNOSIS — F90.2 ATTENTION DEFICIT HYPERACTIVITY DISORDER (ADHD), COMBINED TYPE: ICD-10-CM

## 2020-12-30 RX ORDER — DEXTROAMPHETAMINE SACCHARATE, AMPHETAMINE ASPARTATE MONOHYDRATE, DEXTROAMPHETAMINE SULFATE AND AMPHETAMINE SULFATE 7.5; 7.5; 7.5; 7.5 MG/1; MG/1; MG/1; MG/1
CAPSULE, EXTENDED RELEASE ORAL
Qty: 60 CAPSULE | Refills: 0 | Status: SHIPPED | OUTPATIENT
Start: 2020-12-30 | End: 2021-01-26 | Stop reason: SDUPTHER

## 2021-01-07 ENCOUNTER — OFFICE VISIT (OUTPATIENT)
Dept: PSYCHIATRY | Facility: CLINIC | Age: 31
End: 2021-01-07

## 2021-01-07 DIAGNOSIS — F90.2 ATTENTION DEFICIT HYPERACTIVITY DISORDER (ADHD), COMBINED TYPE: Primary | Chronic | ICD-10-CM

## 2021-01-07 PROCEDURE — 99213 OFFICE O/P EST LOW 20 MIN: CPT | Performed by: PSYCHIATRY & NEUROLOGY

## 2021-01-07 NOTE — PROGRESS NOTES
Subjective   Kan Saavedra is a 30 y.o. male who presents today for follow up via phone  You have chosen to receive care through a telephone visit. Do you consent to use a telephone visit for your medical care today? Yes    Chief Complaint:  To f/u on decreased concentration , to refill meds to maintain stability      History of Present Illness:   The patient was diagnosed with ADHD when he was a child, he was stable on medications,  When on medications he is able to stay focused to finish the project   mood is stable, denied feeling hopeless/helpless, denied AVH  No anxiety related to meds  Today the pt reported feeling good, doing virtual school  And working 2nd shift , taking meds for work , on adderall XR 2 times per day , when on meds - concentration is better , more organized, more productive   The pt denied al drug use     The following portions of the patient's history were reviewed and updated as appropriate: allergies, current medications, past family history, past medical history, past social history, past surgical history and problem list.  PAST PSYCHIATRIC HISTORY      Previous Psychiatric Diagnoses   Axis I: Attention Deficit Disorder     Past Hospitalizations or Residential Treatment   Locations\Providers: none      Past Outpatient Treatment   Diagnosis Treated: Affective Disorder, Anxiety/Panic Dis.  Treatment Type: Medication Management  Location: in school - psych      Prior Psychiatric Medications   Comments: ritalin since 2 grade, then taken off due to side effects      concerta - no difference      adderall - was rxd but did nto take it     viibryd - made numb      vyvanse was effective      Consequences of Mental Disorder   Consequences: family disruption, emotional distress     SOCIAL HISTORY   Single  Number of marriages: 0  Number of children: 1  Current Relationship is: unstable  Family of Origin is: supportive     Current Living Situation   Lives with: children, significant  other     Education   Level: some college     Employment   Job Status: full-time     Hobbies and Leisure Activities   Activity Type: quiet activities     Alcohol use   Freq. drinking: <1  Smoking History   Smoking Hx: Current every day smoker  Pack per day: 1     Exercise   Exercise sessions (per wk): 2     Illicit Drug Use   Illicit Drugs used: none     FAMILY HISTORY OF MENTAL DISORDERS   fh Grandparents: Non-contributory  fh Mother: Affective Disorders  fh Father: Non-contributory  fh Siblings: Non-contributory  fh Other: Non-contributory             Interval History  No Change, stable     Side Effects  Denied       Past Medical History:  Past Medical History:   Diagnosis Date   • ADHD (attention deficit hyperactivity disorder)        Past Surgical History:  History reviewed. No pertinent surgical history.    Problem List:  Patient Active Problem List   Diagnosis   • Attention deficit hyperactivity disorder (ADHD), combined type       Allergy:   No Known Allergies     Discontinued Medications:  There are no discontinued medications.    Current Medications:   Current Outpatient Medications   Medication Sig Dispense Refill   • amphetamine-dextroamphetamine XR (ADDERALL XR) 30 MG 24 hr capsule 1 cap po at 8 AM and 3 PM 60 capsule 0     No current facility-administered medications for this visit.          Review of Symptoms:    Psychiatric/Behavioral: Negative for agitation, behavioral problems, confusion, decreased concentration, dysphoric mood, hallucinations, self-injury, sleep disturbance and suicidal ideas.   The patient is not depressed, not nervous/anxious and is not hyperactive.        Physical Exam:   There were no vitals taken for this visit.    Mental Status Exam:   Hygiene:   unable to assess due to phone visit   Cooperation:  Cooperative  Eye Contact:  unable to assess due to phone visit   Psychomotor Behavior:  Appropriate  Affect:  Blunted congruent with mood   Mood: euthymic  Hopelessness:  Denies  Speech:  Normal  Thought Process:  Goal directed and Linear  Thought Content:  Normal  Suicidal:  None  Homicidal:  None  Hallucinations:  None  Delusion:  None  Memory:  Intact  Orientation:  Person, Place, Time and Situation  Reliability:  good  Insight:  Good  Judgement:  Good  Impulse Control:  Fair  Physical/Medical Issues:  No      MSE from 12/3/19  reviewed and no  changes necessary     PHQ-9 Depression Screening  Little interest or pleasure in doing things? 1   Feeling down, depressed, or hopeless? 1   Trouble falling or staying asleep, or sleeping too much? 1   Feeling tired or having little energy? 1   Poor appetite or overeating? 0   Feeling bad about yourself - or that you are a failure or have let yourself or your family down? 1   Trouble concentrating on things, such as reading the newspaper or watching television? 2   Moving or speaking so slowly that other people could have noticed? Or the opposite - being so fidgety or restless that you have been moving around a lot more than usual? 0   Thoughts that you would be better off dead, or of hurting yourself in some way? 0   PHQ-9 Total Score 7   If you checked off any problems, how difficult have these problems made it for you to do your work, take care of things at home, or get along with other people? Very difficult           Former smoker    I advised Kan of the risks of tobacco use.     Lab Results:   No visits with results within 3 Month(s) from this visit.   Latest known visit with results is:   No results found for any previous visit.       Assessment/Plan   Problems Addressed this Visit        Other    Attention deficit hyperactivity disorder (ADHD), combined type - Primary (Chronic)      Diagnoses       Codes Comments    Attention deficit hyperactivity disorder (ADHD), combined type    -  Primary ICD-10-CM: F90.2  ICD-9-CM: 314.01           Visit Diagnoses:    ICD-10-CM ICD-9-CM   1. Attention deficit hyperactivity disorder (ADHD),  combined type  F90.2 314.01       TREATMENT PLAN/GOALS: Continue supportive psychotherapy efforts and medications as indicated. Treatment and medication options discussed during today's visit. Patient ackowledged and verbally consented to continue with current treatment plan and was educated on the importance of compliance with treatment and follow-up appointments.    MEDICATION ISSUES:  1. ADHD inattentive - Cont current meds, stable on meds, no side effects , weight stable      UDS was + for kratom once, repeated test - consistent   PHQ scored 7 and indicated mild depression     INSPECT reviewed as expected  - last filled on 12/31/2020 will fill when it is due      Discussed medication options and treatment plan of prescribed medication as well as the risks, benefits, and side effects including potential falls, possible impaired driving and metabolic adversities among others. Patient is agreeable to call the office with any worsening of symptoms or onset of side effects. Patient is agreeable to call 911 or go to the nearest ER should he/she begin having SI/HI. No medication side effects or related complaints today.     MEDS ORDERED DURING VISIT:  No orders of the defined types were placed in this encounter.      Return in about 4 months (around 5/7/2021).      This visit has been rescheduled as a phone visit to comply with patient safety concerns in accordance with CDC recommendations. Total time of discussion was 13 minutes.    This document has been electronically signed by Hafsa Oneal MD  January 7, 2021 09:07 EST

## 2021-01-07 NOTE — PATIENT INSTRUCTIONS
Attention Deficit Hyperactivity Disorder, Adult  Attention deficit hyperactivity disorder (ADHD) is a mental health disorder that starts during childhood (neurodevelopmental disorder). For many people with ADHD, the disorder continues into the adult years. Treatment can help you manage your symptoms.  What are the causes?  The exact cause of ADHD is not known. Most experts believe genetics and environmental factors contribute to ADHD.  What increases the risk?  The following factors may make you more likely to develop this condition:  · Having a family history of ADHD.  · Being male.  · Being born to a mother who smoked or drank alcohol during pregnancy.  · Being exposed to lead or other toxins in the womb or early in life.  · Being born before 37 weeks of pregnancy (prematurely) or at a low birth weight.  · Having experienced a brain injury.  What are the signs or symptoms?  Symptoms of this condition depend on the type of ADHD. The two main types are inattentive and hyperactive-impulsive. Some people may have symptoms of both types.  Symptoms of the inattentive type include:  · Difficulty paying attention.  · Making careless mistakes.  · Not following instructions.  · Being disorganized.  · Avoiding tasks that require time and attention.  · Losing and forgetting things.  · Being easily distracted.  Symptoms of the hyperactive-impulsive type include:  · Restlessness.  · Talking too much.  · Interrupting.  · Difficulty with:  ? Sitting still.  ? Feeling motivated.  ? Relaxing.  ? Waiting in line or waiting for a turn.  In adults, this condition may lead to certain problems, such as:  · Keeping jobs.  · Performing tasks at work.  · Having stable relationships.  · Being on time or keeping to a schedule.  How is this diagnosed?  This condition is diagnosed based on your current symptoms and your history of symptoms. The diagnosis can be made by a health care provider such as a primary care provider or a mental health  care specialist.  Your health care provider may use a symptom checklist or a behavior rating scale to evaluate your symptoms. He or she may also want to talk with people who have observed your behaviors throughout your life.  How is this treated?  This condition can be treated with medicines and behavior therapy. Medicines may be the best option to reduce impulsive behaviors and improve attention. Your health care provider may recommend:  · Stimulant medicines. These are the most common medicines used for adult ADHD. They affect certain chemicals in the brain (neurotransmitters) and improve your ability to control your symptoms.  · A non-stimulant medicine for adult ADHD (atomoxetine). This medicine increases a neurotransmitter called norepinephrine. It may take weeks to months to see effects from this medicine.  Counseling and behavioral management are also important for treating ADHD. Counseling is often used along with medicine. Your health care provider may suggest:  · Cognitive behavioral therapy (CBT). This type of therapy teaches you to replace negative thoughts and actions with positive thoughts and actions. When used as part of ADHD treatment, this therapy may also include:  ? Coping strategies for organization, time management, impulse control, and stress reduction.  ? Mindfulness and meditation training.  · Behavioral management. You may work with a  who is specially trained to help people with ADHD manage and organize activities and function more effectively.  Follow these instructions at home:  Medicines    · Take over-the-counter and prescription medicines only as told by your health care provider.  · Talk with your health care provider about the possible side effects of your medicines and how to manage them.  Lifestyle    · Do not use drugs.  · Do not drink alcohol if:  ? Your health care provider tells you not to drink.  ? You are pregnant, may be pregnant, or are planning to become  pregnant.  · If you drink alcohol:  ? Limit how much you use to:  § 0-1 drink a day for women.  § 0-2 drinks a day for men.  ? Be aware of how much alcohol is in your drink. In the [place] one drink equals one 12 oz bottle of beer (355 mL), one 5 oz glass of wine (148 mL), or one 1½ oz glass of hard liquor (44 mL).  · Get enough sleep.  · Eat a healthy diet.  · Exercise regularly. Exercise can help to reduce stress and anxiety.  General instructions  · Learn as much as you can about adult ADHD, and work closely with your health care providers to find the treatments that work best for you.  · Follow the same schedule each day.  · Use reminder devices like notes, calendars, and phone apps to stay on time and organized.  · Keep all follow-up visits as told by your health care provider and therapist. This is important.  Where to find more information  A health care provider may be able to recommend resources that are available online or over the phone. You could start with:  · Attention Deficit Disorder Association (ADDA): www.add.org  · National [place] of [employer] Health (NIM): www.nimh.nih.gov  Contact a health care provider if:  · Your symptoms continue to cause problems.  · You have side effects from your medicine, such as:  ? Repeated muscle twitches, coughing, or speech outbursts.  ? Sleep problems.  ? Loss of appetite.  ? Dizziness.  ? Unusually fast heartbeat.  ? Stomach pains.  ? Headaches.  · You are struggling with anxiety, depression, or substance abuse.  Get help right away if you:  · Have a severe reaction to a medicine.  If you ever feel like you may hurt yourself or others, or have thoughts about taking your own life, get help right away. You can go to the nearest emergency department or call:  · Your local emergency services (911 in the [place]  · A suicide crisis helpline, such as the National Suicide Prevention Lifeline at 1-744.947.4528. This is open 24 hours a day.  Summary  · ADHD is a mental health  disorder that starts during childhood (neurodevelopmental disorder) and often continues into the adult years.  · The exact cause of ADHD is not known. Most experts believe genetics and environmental factors contribute to ADHD.  · There is no cure for ADHD, but treatment with medicine, cognitive behavioral therapy, or behavioral management can help you manage your condition.  This information is not intended to replace advice given to you by your health care provider. Make sure you discuss any questions you have with your health care provider.  Document Revised: 05/11/2020 Document Reviewed: 05/11/2020  Elsevier Patient Education © 2020 [employer]

## 2021-01-26 DIAGNOSIS — F90.2 ATTENTION DEFICIT HYPERACTIVITY DISORDER (ADHD), COMBINED TYPE: ICD-10-CM

## 2021-01-26 RX ORDER — DEXTROAMPHETAMINE SACCHARATE, AMPHETAMINE ASPARTATE MONOHYDRATE, DEXTROAMPHETAMINE SULFATE AND AMPHETAMINE SULFATE 7.5; 7.5; 7.5; 7.5 MG/1; MG/1; MG/1; MG/1
CAPSULE, EXTENDED RELEASE ORAL
Qty: 60 CAPSULE | Refills: 0 | Status: SHIPPED | OUTPATIENT
Start: 2021-01-26 | End: 2021-02-25 | Stop reason: SDUPTHER

## 2021-01-28 DIAGNOSIS — F90.2 ATTENTION DEFICIT HYPERACTIVITY DISORDER (ADHD), COMBINED TYPE: ICD-10-CM

## 2021-01-28 RX ORDER — DEXTROAMPHETAMINE SACCHARATE, AMPHETAMINE ASPARTATE MONOHYDRATE, DEXTROAMPHETAMINE SULFATE AND AMPHETAMINE SULFATE 7.5; 7.5; 7.5; 7.5 MG/1; MG/1; MG/1; MG/1
CAPSULE, EXTENDED RELEASE ORAL
Qty: 60 CAPSULE | Refills: 0 | OUTPATIENT
Start: 2021-01-28

## 2021-02-25 DIAGNOSIS — F90.2 ATTENTION DEFICIT HYPERACTIVITY DISORDER (ADHD), COMBINED TYPE: ICD-10-CM

## 2021-02-26 RX ORDER — DEXTROAMPHETAMINE SACCHARATE, AMPHETAMINE ASPARTATE MONOHYDRATE, DEXTROAMPHETAMINE SULFATE AND AMPHETAMINE SULFATE 7.5; 7.5; 7.5; 7.5 MG/1; MG/1; MG/1; MG/1
CAPSULE, EXTENDED RELEASE ORAL
Qty: 60 CAPSULE | Refills: 0 | Status: SHIPPED | OUTPATIENT
Start: 2021-02-26 | End: 2021-03-26 | Stop reason: SDUPTHER

## 2021-03-26 DIAGNOSIS — F90.2 ATTENTION DEFICIT HYPERACTIVITY DISORDER (ADHD), COMBINED TYPE: ICD-10-CM

## 2021-03-26 RX ORDER — DEXTROAMPHETAMINE SACCHARATE, AMPHETAMINE ASPARTATE MONOHYDRATE, DEXTROAMPHETAMINE SULFATE AND AMPHETAMINE SULFATE 7.5; 7.5; 7.5; 7.5 MG/1; MG/1; MG/1; MG/1
CAPSULE, EXTENDED RELEASE ORAL
Qty: 60 CAPSULE | Refills: 0 | Status: SHIPPED | OUTPATIENT
Start: 2021-03-26 | End: 2021-04-27 | Stop reason: SDUPTHER

## 2021-04-27 DIAGNOSIS — F90.2 ATTENTION DEFICIT HYPERACTIVITY DISORDER (ADHD), COMBINED TYPE: ICD-10-CM

## 2021-04-28 RX ORDER — DEXTROAMPHETAMINE SACCHARATE, AMPHETAMINE ASPARTATE MONOHYDRATE, DEXTROAMPHETAMINE SULFATE AND AMPHETAMINE SULFATE 7.5; 7.5; 7.5; 7.5 MG/1; MG/1; MG/1; MG/1
CAPSULE, EXTENDED RELEASE ORAL
Qty: 60 CAPSULE | Refills: 0 | Status: SHIPPED | OUTPATIENT
Start: 2021-04-28 | End: 2021-05-27 | Stop reason: SDUPTHER

## 2021-04-29 ENCOUNTER — OFFICE VISIT (OUTPATIENT)
Dept: PSYCHIATRY | Facility: CLINIC | Age: 31
End: 2021-04-29

## 2021-04-29 DIAGNOSIS — F90.2 ATTENTION DEFICIT HYPERACTIVITY DISORDER (ADHD), COMBINED TYPE: Primary | Chronic | ICD-10-CM

## 2021-04-29 PROCEDURE — 99213 OFFICE O/P EST LOW 20 MIN: CPT | Performed by: PSYCHIATRY & NEUROLOGY

## 2021-04-29 NOTE — PROGRESS NOTES
Subjective   Kan Saavedra is a 31 y.o. male who presents today for follow up       Chief Complaint:  To f/u on decreased concentration , to refill meds to maintain stability      History of Present Illness:   The patient was diagnosed with ADHD when he was a child, he was stable on medications,  When on medications he is able to stay focused to finish the project   mood is stable, denied feeling hopeless/helpless, denied AVH  No anxiety related to meds    Today the pt reported feeling stable on meds, work is stressful in general,   The pt is still  working 2nd shift , taking meds for work , on adderall XR 2 times per day , when on meds - concentration is better , more organized, more productive     The pt denied al drug use     The following portions of the patient's history were reviewed and updated as appropriate: allergies, current medications, past family history, past medical history, past social history, past surgical history and problem list.  PAST PSYCHIATRIC HISTORY      Previous Psychiatric Diagnoses   Axis I: Attention Deficit Disorder     Past Hospitalizations or Residential Treatment   Locations\Providers: none      Past Outpatient Treatment   Diagnosis Treated: Affective Disorder, Anxiety/Panic Dis.  Treatment Type: Medication Management  Location: in school - psych      Prior Psychiatric Medications   Comments: ritalin since 2 grade, then taken off due to side effects      concerta - no difference      adderall - was rxd but did nto take it     viibryd - made numb      vyvanse was effective      Consequences of Mental Disorder   Consequences: family disruption, emotional distress     SOCIAL HISTORY   Single  Number of marriages: 0  Number of children: 1  Current Relationship is: unstable  Family of Origin is: supportive     Current Living Situation   Lives with: children, significant other     Education   Level: some college     Employment   Job Status: full-time     Hobbies and Leisure Activities    Activity Type: quiet activities     Alcohol use   Freq. drinking: <1  Smoking History   Smoking Hx: Current every day smoker  Pack per day: 1     Exercise   Exercise sessions (per wk): 2     Illicit Drug Use   Illicit Drugs used: none     FAMILY HISTORY OF MENTAL DISORDERS   fh Grandparents: Non-contributory  fh Mother: Affective Disorders  fh Father: Non-contributory  fh Siblings: Non-contributory  fh Other: Non-contributory             Interval History  No Change, stable     Side Effects  Denied       Past Medical History:  Past Medical History:   Diagnosis Date   • ADHD (attention deficit hyperactivity disorder)        Past Surgical History:  History reviewed. No pertinent surgical history.    Problem List:  Patient Active Problem List   Diagnosis   • Attention deficit hyperactivity disorder (ADHD), combined type       Allergy:   No Known Allergies     Discontinued Medications:  There are no discontinued medications.    Current Medications:   Current Outpatient Medications   Medication Sig Dispense Refill   • amphetamine-dextroamphetamine XR (ADDERALL XR) 30 MG 24 hr capsule 1 cap po at 8 AM and 3 PM 60 capsule 0     No current facility-administered medications for this visit.         Review of Symptoms:    Psychiatric/Behavioral: Negative for agitation, behavioral problems, confusion, decreased concentration, dysphoric mood, hallucinations, self-injury, sleep disturbance and suicidal ideas.   The patient is not depressed, not nervous/anxious and is not hyperactive.        Physical Exam:   There were no vitals taken for this visit.    Mental Status Exam:   Hygiene:   good  Cooperation:  Cooperative  Eye Contact:  Good  Psychomotor Behavior:  Appropriate  Affect:  Appropriate   Mood: euthymic  Hopelessness: Denies  Speech:  Normal  Thought Process:  Goal directed and Linear  Thought Content:  Normal  Suicidal:  None  Homicidal:  None  Hallucinations:  None  Delusion:  None  Memory:  Intact  Orientation:  Person,  Place, Time and Situation  Reliability:  good  Insight:  Good  Judgement:  Good  Impulse Control:  Fair  Physical/Medical Issues:  No      MSE from  1/28/2021  reviewed and accepted with changes     PHQ-9 Depression Screening  Little interest or pleasure in doing things? 1   Feeling down, depressed, or hopeless? 1   Trouble falling or staying asleep, or sleeping too much? 0   Feeling tired or having little energy? 2   Poor appetite or overeating? 0   Feeling bad about yourself - or that you are a failure or have let yourself or your family down? 1   Trouble concentrating on things, such as reading the newspaper or watching television? 2   Moving or speaking so slowly that other people could have noticed? Or the opposite - being so fidgety or restless that you have been moving around a lot more than usual? 0   Thoughts that you would be better off dead, or of hurting yourself in some way? 0   PHQ-9 Total Score 7   If you checked off any problems, how difficult have these problems made it for you to do your work, take care of things at home, or get along with other people? Very difficult           Former smoker    I advised Kan of the risks of tobacco use.     Lab Results:   No visits with results within 3 Month(s) from this visit.   Latest known visit with results is:   No results found for any previous visit.       Assessment/Plan   Problems Addressed this Visit        Mental Health    Attention deficit hyperactivity disorder (ADHD), combined type - Primary (Chronic)      Diagnoses       Codes Comments    Attention deficit hyperactivity disorder (ADHD), combined type    -  Primary ICD-10-CM: F90.2  ICD-9-CM: 314.01           Visit Diagnoses:    ICD-10-CM ICD-9-CM   1. Attention deficit hyperactivity disorder (ADHD), combined type  F90.2 314.01       TREATMENT PLAN/GOALS: Continue supportive psychotherapy efforts and medications as indicated. Treatment and medication options discussed during today's visit.  Patient ackowledged and verbally consented to continue with current treatment plan and was educated on the importance of compliance with treatment and follow-up appointments.    MEDICATION ISSUES:  1. ADHD inattentive - Cont current meds, stable on meds, no side effects , weight stable      UDS 10/27/2020  Consistent     PHQ scored 7  and indicated mild depression     INSPECT reviewed as expected  - last filled on 3/29/201 and 4/28/2021  will fill when it is due      Discussed medication options and treatment plan of prescribed medication as well as the risks, benefits, and side effects including potential falls, possible impaired driving and metabolic adversities among others. Patient is agreeable to call the office with any worsening of symptoms or onset of side effects. Patient is agreeable to call 911 or go to the nearest ER should he/she begin having SI/HI. No medication side effects or related complaints today.     MEDS ORDERED DURING VISIT:  No orders of the defined types were placed in this encounter.  refill was sent on 4/28/2021     Return in about 4 months (around 8/29/2021).       This document has been electronically signed by Hafsa Oneal MD  April 29, 2021 09:13 EDT

## 2021-04-29 NOTE — PATIENT INSTRUCTIONS
Attention Deficit Hyperactivity Disorder, Adult  Attention deficit hyperactivity disorder (ADHD) is a mental health disorder that starts during childhood (neurodevelopmental disorder). For many people with ADHD, the disorder continues into the adult years. Treatment can help you manage your symptoms.  What are the causes?  The exact cause of ADHD is not known. Most experts believe genetics and environmental factors contribute to ADHD.  What increases the risk?  The following factors may make you more likely to develop this condition:  · Having a family history of ADHD.  · Being male.  · Being born to a mother who smoked or drank alcohol during pregnancy.  · Being exposed to lead or other toxins in the womb or early in life.  · Being born before 37 weeks of pregnancy (prematurely) or at a low birth weight.  · Having experienced a brain injury.  What are the signs or symptoms?  Symptoms of this condition depend on the type of ADHD. The two main types are inattentive and hyperactive-impulsive. Some people may have symptoms of both types.  Symptoms of the inattentive type include:  · Difficulty paying attention.  · Making careless mistakes.  · Not following instructions.  · Being disorganized.  · Avoiding tasks that require time and attention.  · Losing and forgetting things.  · Being easily distracted.  Symptoms of the hyperactive-impulsive type include:  · Restlessness.  · Talking too much.  · Interrupting.  · Difficulty with:  ? Sitting still.  ? Feeling motivated.  ? Relaxing.  ? Waiting in line or waiting for a turn.  In adults, this condition may lead to certain problems, such as:  · Keeping jobs.  · Performing tasks at work.  · Having stable relationships.  · Being on time or keeping to a schedule.  How is this diagnosed?  This condition is diagnosed based on your current symptoms and your history of symptoms. The diagnosis can be made by a health care provider such as a primary care provider or a mental health  care specialist.  Your health care provider may use a symptom checklist or a behavior rating scale to evaluate your symptoms. He or she may also want to talk with people who have observed your behaviors throughout your life.  How is this treated?  This condition can be treated with medicines and behavior therapy. Medicines may be the best option to reduce impulsive behaviors and improve attention. Your health care provider may recommend:  · Stimulant medicines. These are the most common medicines used for adult ADHD. They affect certain chemicals in the brain (neurotransmitters) and improve your ability to control your symptoms.  · A non-stimulant medicine for adult ADHD (atomoxetine). This medicine increases a neurotransmitter called norepinephrine. It may take weeks to months to see effects from this medicine.  Counseling and behavioral management are also important for treating ADHD. Counseling is often used along with medicine. Your health care provider may suggest:  · Cognitive behavioral therapy (CBT). This type of therapy teaches you to replace negative thoughts and actions with positive thoughts and actions. When used as part of ADHD treatment, this therapy may also include:  ? Coping strategies for organization, time management, impulse control, and stress reduction.  ? Mindfulness and meditation training.  · Behavioral management. You may work with a  who is specially trained to help people with ADHD manage and organize activities and function more effectively.  Follow these instructions at home:  Medicines    · Take over-the-counter and prescription medicines only as told by your health care provider.  · Talk with your health care provider about the possible side effects of your medicines and how to manage them.  Lifestyle    · Do not use drugs.  · Do not drink alcohol if:  ? Your health care provider tells you not to drink.  ? You are pregnant, may be pregnant, or are planning to become  pregnant.  · If you drink alcohol:  ? Limit how much you use to:  § 0-1 drink a day for women.  § 0-2 drinks a day for men.  ? Be aware of how much alcohol is in your drink. In the [place] one drink equals one 12 oz bottle of beer (355 mL), one 5 oz glass of wine (148 mL), or one 1½ oz glass of hard liquor (44 mL).  · Get enough sleep.  · Eat a healthy diet.  · Exercise regularly. Exercise can help to reduce stress and anxiety.  General instructions  · Learn as much as you can about adult ADHD, and work closely with your health care providers to find the treatments that work best for you.  · Follow the same schedule each day.  · Use reminder devices like notes, calendars, and phone apps to stay on time and organized.  · Keep all follow-up visits as told by your health care provider and therapist. This is important.  Where to find more information  A health care provider may be able to recommend resources that are available online or over the phone. You could start with:  · Attention Deficit Disorder Association (ADDA): www.add.org  · National [place] of Mental Health (NIM): www.nimh.nih.gov  Contact a health care provider if:  · Your symptoms continue to cause problems.  · You have side effects from your medicine, such as:  ? Repeated muscle twitches, coughing, or speech outbursts.  ? Sleep problems.  ? Loss of appetite.  ? Dizziness.  ? Unusually fast heartbeat.  ? Stomach pains.  ? Headaches.  · You are struggling with anxiety, depression, or substance abuse.  Get help right away if you:  · Have a severe reaction to a medicine.  If you ever feel like you may hurt yourself or others, or have thoughts about taking your own life, get help right away. You can go to the nearest emergency department or call:  · Your local emergency services (911 in the [place]  · A suicide crisis helpline, such as the National Suicide Prevention Lifeline at 1-704.388.2847. This is open 24 hours a day.  Summary  · ADHD is a mental health  disorder that starts during childhood (neurodevelopmental disorder) and often continues into the adult years.  · The exact cause of ADHD is not known. Most experts believe genetics and environmental factors contribute to ADHD.  · There is no cure for ADHD, but treatment with medicine, cognitive behavioral therapy, or behavioral management can help you manage your condition.  This information is not intended to replace advice given to you by your health care provider. Make sure you discuss any questions you have with your health care provider.  Document Revised: 05/11/2020 Document Reviewed: 05/11/2020  [employer] Patient Education © 2021 [employer]

## 2021-05-27 DIAGNOSIS — F90.2 ATTENTION DEFICIT HYPERACTIVITY DISORDER (ADHD), COMBINED TYPE: ICD-10-CM

## 2021-05-28 RX ORDER — DEXTROAMPHETAMINE SACCHARATE, AMPHETAMINE ASPARTATE MONOHYDRATE, DEXTROAMPHETAMINE SULFATE AND AMPHETAMINE SULFATE 7.5; 7.5; 7.5; 7.5 MG/1; MG/1; MG/1; MG/1
CAPSULE, EXTENDED RELEASE ORAL
Qty: 60 CAPSULE | Refills: 0 | Status: SHIPPED | OUTPATIENT
Start: 2021-05-28 | End: 2021-06-24 | Stop reason: SDUPTHER

## 2021-06-24 DIAGNOSIS — F90.2 ATTENTION DEFICIT HYPERACTIVITY DISORDER (ADHD), COMBINED TYPE: ICD-10-CM

## 2021-06-25 RX ORDER — DEXTROAMPHETAMINE SACCHARATE, AMPHETAMINE ASPARTATE MONOHYDRATE, DEXTROAMPHETAMINE SULFATE AND AMPHETAMINE SULFATE 7.5; 7.5; 7.5; 7.5 MG/1; MG/1; MG/1; MG/1
CAPSULE, EXTENDED RELEASE ORAL
Qty: 60 CAPSULE | Refills: 0 | Status: SHIPPED | OUTPATIENT
Start: 2021-06-25 | End: 2021-07-26 | Stop reason: SDUPTHER

## 2021-07-26 DIAGNOSIS — F90.2 ATTENTION DEFICIT HYPERACTIVITY DISORDER (ADHD), COMBINED TYPE: ICD-10-CM

## 2021-07-27 RX ORDER — DEXTROAMPHETAMINE SACCHARATE, AMPHETAMINE ASPARTATE MONOHYDRATE, DEXTROAMPHETAMINE SULFATE AND AMPHETAMINE SULFATE 7.5; 7.5; 7.5; 7.5 MG/1; MG/1; MG/1; MG/1
CAPSULE, EXTENDED RELEASE ORAL
Qty: 60 CAPSULE | Refills: 0 | Status: SHIPPED | OUTPATIENT
Start: 2021-07-27 | End: 2021-08-26 | Stop reason: SDUPTHER

## 2021-08-25 DIAGNOSIS — F90.2 ATTENTION DEFICIT HYPERACTIVITY DISORDER (ADHD), COMBINED TYPE: ICD-10-CM

## 2021-08-25 RX ORDER — DEXTROAMPHETAMINE SACCHARATE, AMPHETAMINE ASPARTATE MONOHYDRATE, DEXTROAMPHETAMINE SULFATE AND AMPHETAMINE SULFATE 7.5; 7.5; 7.5; 7.5 MG/1; MG/1; MG/1; MG/1
CAPSULE, EXTENDED RELEASE ORAL
Qty: 60 CAPSULE | Refills: 0 | Status: CANCELLED | OUTPATIENT
Start: 2021-08-25

## 2021-08-26 ENCOUNTER — OFFICE VISIT (OUTPATIENT)
Dept: PSYCHIATRY | Facility: CLINIC | Age: 31
End: 2021-08-26

## 2021-08-26 DIAGNOSIS — F90.2 ATTENTION DEFICIT HYPERACTIVITY DISORDER (ADHD), COMBINED TYPE: Primary | Chronic | ICD-10-CM

## 2021-08-26 PROCEDURE — 99213 OFFICE O/P EST LOW 20 MIN: CPT | Performed by: PSYCHIATRY & NEUROLOGY

## 2021-08-26 RX ORDER — DEXTROAMPHETAMINE SACCHARATE, AMPHETAMINE ASPARTATE MONOHYDRATE, DEXTROAMPHETAMINE SULFATE AND AMPHETAMINE SULFATE 7.5; 7.5; 7.5; 7.5 MG/1; MG/1; MG/1; MG/1
CAPSULE, EXTENDED RELEASE ORAL
Qty: 60 CAPSULE | Refills: 0 | Status: SHIPPED | OUTPATIENT
Start: 2021-08-26 | End: 2021-09-23 | Stop reason: SDUPTHER

## 2021-08-26 NOTE — PROGRESS NOTES
Subjective   Kan Saavedra is a 31 y.o. male who presents today for follow up       Chief Complaint:   Decreased concentration     History of Present Illness:   The patient was diagnosed with ADHD when he was a child, he was stable on medications,  When on medications he is able to stay focused to finish the project   mood is stable, denied feeling hopeless/helpless, denied AVH  No anxiety related to meds    Today the pt reported feeling stable on meds, work is stressful in general, he is taking care of his family, school and work     The pt is still  working 2nd shift , taking meds for work , on adderall XR 2 times per day , when on meds - concentration is better , more organized, more productive   Tolerated well, no palpitations, BP is stable   Sleep - some bouts of insomnia (when shifts change)  but overall sufficient   The pt denied al drug use     The following portions of the patient's history were reviewed and updated as appropriate: allergies, current medications, past family history, past medical history, past social history, past surgical history and problem list.  PAST PSYCHIATRIC HISTORY      Previous Psychiatric Diagnoses   Axis I: Attention Deficit Disorder     Past Hospitalizations or Residential Treatment   Locations\Providers: none      Past Outpatient Treatment   Diagnosis Treated: Affective Disorder, Anxiety/Panic Dis.  Treatment Type: Medication Management  Location: in school - psych      Prior Psychiatric Medications   Comments: ritalin since 2 grade, then taken off due to side effects      concerta - no difference      adderall - was rxd but did nto take it     viibryd - made numb      vyvanse was effective      Consequences of Mental Disorder   Consequences: family disruption, emotional distress     SOCIAL HISTORY   Single  Number of marriages: 0  Number of children: 1  Current Relationship is: unstable  Family of Origin is: supportive     Current Living Situation   Lives with: children,  significant other     Education   Level: some college     Employment   Job Status: full-time     Hobbies and Leisure Activities   Activity Type: quiet activities     Alcohol use   Freq. drinking: <1  Smoking History   Smoking Hx: Current every day smoker  Pack per day: 1     Exercise   Exercise sessions (per wk): 2     Illicit Drug Use   Illicit Drugs used: none     FAMILY HISTORY OF MENTAL DISORDERS   fh Grandparents: Non-contributory  fh Mother: Affective Disorders  fh Father: Non-contributory  fh Siblings: Non-contributory  fh Other: Non-contributory             Interval History  No Change, stable     Side Effects  Denied       Past Medical History:  Past Medical History:   Diagnosis Date   • ADHD (attention deficit hyperactivity disorder)        Past Surgical History:  History reviewed. No pertinent surgical history.    Problem List:  Patient Active Problem List   Diagnosis   • Attention deficit hyperactivity disorder (ADHD), combined type       Allergy:   No Known Allergies     Discontinued Medications:  Medications Discontinued During This Encounter   Medication Reason   • amphetamine-dextroamphetamine XR (ADDERALL XR) 30 MG 24 hr capsule Reorder       Current Medications:   Current Outpatient Medications   Medication Sig Dispense Refill   • amphetamine-dextroamphetamine XR (ADDERALL XR) 30 MG 24 hr capsule 1 cap po at 8 AM and 3 PM 60 capsule 0     No current facility-administered medications for this visit.         Review of Symptoms:    Psychiatric/Behavioral: Negative for agitation, behavioral problems, confusion, decreased concentration, dysphoric mood, hallucinations, self-injury, sleep disturbance and suicidal ideas.   The patient is not depressed, not nervous/anxious and is not hyperactive.        Physical Exam:   There were no vitals taken for this visit.    Mental Status Exam:   Hygiene:   good  Cooperation:  Cooperative  Eye Contact:  Good  Psychomotor Behavior:  Appropriate  Affect:  Appropriate    Mood: euthymic  Hopelessness: Denies  Speech:  Normal  Thought Process:  Goal directed and Linear  Thought Content:  Normal  Suicidal:  None  Homicidal:  None  Hallucinations:  None  Delusion:  None  Memory:  Intact  Orientation:  Person, Place, Time and Situation  Reliability:  good  Insight:  Good  Judgement:  Good  Impulse Control:  Fair  Physical/Medical Issues:  No      MSE from  4/29/2021  reviewed and no changes necessary     PHQ-9 Depression Screening  Little interest or pleasure in doing things? 1   Feeling down, depressed, or hopeless? 1   Trouble falling or staying asleep, or sleeping too much? 1   Feeling tired or having little energy? 2   Poor appetite or overeating? 0   Feeling bad about yourself - or that you are a failure or have let yourself or your family down? 1   Trouble concentrating on things, such as reading the newspaper or watching television? 2   Moving or speaking so slowly that other people could have noticed? Or the opposite - being so fidgety or restless that you have been moving around a lot more than usual? 0   Thoughts that you would be better off dead, or of hurting yourself in some way? 0   PHQ-9 Total Score 8   If you checked off any problems, how difficult have these problems made it for you to do your work, take care of things at home, or get along with other people? Very difficult           Former smoker    I advised Kan of the risks of tobacco use.     Lab Results:   No visits with results within 3 Month(s) from this visit.   Latest known visit with results is:   No results found for any previous visit.       Assessment/Plan   Problems Addressed this Visit        Mental Health    Attention deficit hyperactivity disorder (ADHD), combined type - Primary (Chronic)    Relevant Medications    amphetamine-dextroamphetamine XR (ADDERALL XR) 30 MG 24 hr capsule      Diagnoses       Codes Comments    Attention deficit hyperactivity disorder (ADHD), combined type    -  Primary  ICD-10-CM: F90.2  ICD-9-CM: 314.01           Visit Diagnoses:    ICD-10-CM ICD-9-CM   1. Attention deficit hyperactivity disorder (ADHD), combined type  F90.2 314.01       TREATMENT PLAN/GOALS: Continue supportive psychotherapy efforts and medications as indicated. Treatment and medication options discussed during today's visit. Patient ackowledged and verbally consented to continue with current treatment plan and was educated on the importance of compliance with treatment and follow-up appointments.    MEDICATION ISSUES:  1. ADHD inattentive - Cont current meds, stable on meds, no side effects , weight stable   He was on vyvanse in the past but it was not covered by his ins, they changed insurance now and he is thinking to switch back to      UDS 10/27/2020  Consistent     PHQ scored 8  and indicated mild depression     INSPECT reviewed as expected  - last filled  On 2021  will fill when it is due    UDS 10/27/2021 - consistent     Discussed medication options and treatment plan of prescribed medication as well as the risks, benefits, and side effects including potential falls, possible impaired driving and metabolic adversities among others. Patient is agreeable to call the office with any worsening of symptoms or onset of side effects. Patient is agreeable to call 911 or go to the nearest ER should he/she begin having SI/HI. No medication side effects or related complaints today.     MEDS ORDERED DURING VISIT:  New Medications Ordered This Visit   Medications   • amphetamine-dextroamphetamine XR (ADDERALL XR) 30 MG 24 hr capsule     Si cap po at 8 AM and 3 PM     Dispense:  60 capsule     Refill:  0   refill was sent on 2021     Return in about 4 months (around 2021).       This document has been electronically signed by Hafsa Oneal MD  2021 09:08 EDT   negative

## 2021-08-26 NOTE — PATIENT INSTRUCTIONS
Living With Attention Deficit Hyperactivity Disorder  If you have been diagnosed with attention deficit hyperactivity disorder (ADHD), you may be relieved that you now know why you have felt or behaved a certain way. Still, you may feel overwhelmed about the treatment ahead. You may also wonder how to get the support you need and how to deal with the condition day-to-day. With treatment and support, you can live with ADHD and manage your symptoms.  How to manage lifestyle changes  Managing stress  Stress is your body's reaction to life changes and events, both good and bad. To cope with the stress of an ADHD diagnosis, it may help to:  · Learn more about ADHD.  · Exercise regularly. Even a short daily walk can lower stress levels.  · Participate in training or education programs (including social skills training classes) that teach you to deal with symptoms.    Medicines  Your health care provider may suggest certain medicines if he or she feels that they will help to improve your condition. Stimulant medicines are usually prescribed to treat ADHD, and therapy may also be prescribed. It is important to:  · Avoid using alcohol and other substances that may prevent your medicines from working properly (may interact).   · Talk with your pharmacist or health care provider about all the medicines that you take, their possible side effects, and what medicines are safe to take together.  · Make it your goal to take part in all treatment decisions (shared decision-making). Ask about possible side effects of medicines that your health care provider recommends, and tell him or her how you feel about having those side effects. It is best if shared decision-making with your health care provider is part of your total treatment plan.  Relationships  To strengthen your relationships with family members while treating your condition, consider taking part in family therapy. You might also attend self-help groups alone or with a  loved one.  Be honest about how your symptoms affect your relationships. Make an effort to communicate respectfully instead of fighting, and find ways to show others that you care. Psychotherapy may be useful in helping you cope with how ADHD affects your relationships.  How to recognize changes in your condition  The following signs may mean that your treatment is working well and your condition is improving:  · Consistently being on time for appointments.  · Being more organized at home and work.  · Other people noticing improvements in your behavior.  · Achieving goals that you set for yourself.  · Thinking more clearly.  The following signs may mean that your treatment is not working very well:  · Feeling impatience or more confusion.  · Missing, forgetting, or being late for appointments.  · An increasing sense of disorganization and messiness.  · More difficulty in reaching goals that you set for yourself.  · Loved ones becoming angry or frustrated with you.  Where to find support  Talking to others  · Keep emotion out of important discussions and speak in a calm, logical way.  · Listen closely and patiently to your loved ones. Try to understand their point of view, and try to avoid getting defensive.  · Take responsibility for the consequences of your actions.  · Ask that others do not take your behaviors personally.  · Aim to solve problems as they come up, and express your feelings instead of bottling them up.  · Talk openly about what you need from your loved ones and how they can support you.  · Consider going to family therapy sessions or having your family meet with a specialist who deals with ADHD-related behavior problems.  Finances  Not all insurance plans cover mental health care, so it is important to check with your insurance carrier. If paying for co-pays or counseling services is a problem, search for a [facility] or [facility] mental health care center. Public mental health care services may be offered  there at a low cost or no cost when you are not able to see a private health care provider.  If you are taking medicine for ADHD, you may be able to get the generic form, which may be less expensive than brand-name medicine. Some makers of prescription medicines also offer help to patients who cannot afford the medicines that they need.  Follow these instructions at home:  · Take over-the-counter and prescription medicines only as told by your health care provider. Check with your health care provider before taking any new medicines.  · Create structure and an organized atmosphere at home. For example:  ? Make a list of tasks, then rank them from most important to least important. Work on one task at a time until your listed tasks are done.  ? Make a daily schedule and follow it consistently every day.  ? Use an appointment calendar, and check it 2 or 3 times a day to keep on track. Keep it with you when you leave the house.  ? Create spaces where you keep certain things, and always put things back in their places after you use them.  · Keep all follow-up visits as told by your health care provider. This is important.  Questions to ask your health care provider:  · What are the risks and benefits of taking medicines?  · Would I benefit from therapy?  · How often should I follow up with a health care provider?  Contact a health care provider if:  · You have side effects from your medicines, such as:  ? Repeated muscle twitches, coughing, or speech outbursts.  ? Sleep problems.  ? Loss of appetite.  ? Depression.  ? New or worsening behavior problems.  ? Dizziness.  ? Unusually fast heartbeat.  ? Stomach pains.  ? Headaches.  Get help right away if:  · You have a severe reaction to a medicine.  · Your behavior suddenly gets worse.  Summary  · With treatment and support, you can live with ADHD and manage your symptoms.  · The medicines that are most often prescribed for ADHD are stimulants.  · Consider taking part in  family therapy or self-help groups with family members or friends.  · When you talk with friends and family about your ADHD, be patient and communicate openly.  · Take over-the-counter and prescription medicines only as told by your health care provider. Check with your health care provider before taking any new medicines.  This information is not intended to replace advice given to you by your health care provider. Make sure you discuss any questions you have with your health care provider.  Document Revised: 04/10/2020 Document Reviewed: 04/19/2018  [employer]vier Patient Education © 2021 [employer]

## 2021-09-23 DIAGNOSIS — F90.2 ATTENTION DEFICIT HYPERACTIVITY DISORDER (ADHD), COMBINED TYPE: Chronic | ICD-10-CM

## 2021-09-23 NOTE — TELEPHONE ENCOUNTER
Rx Refill Note  Requested Prescriptions      No prescriptions requested or ordered in this encounter      Last office visit with prescribing clinician: 8/26/2021      Next office visit with prescribing clinician: 12/30/2021   Office Visit with Hafsa Oneal MD (08/26/2021)       Urine Drug Screen - Urine, Clean Catch (10/27/2020)      Trish Garcia MA  09/23/21, 14:27 EDT

## 2021-09-24 RX ORDER — DEXTROAMPHETAMINE SACCHARATE, AMPHETAMINE ASPARTATE MONOHYDRATE, DEXTROAMPHETAMINE SULFATE AND AMPHETAMINE SULFATE 7.5; 7.5; 7.5; 7.5 MG/1; MG/1; MG/1; MG/1
CAPSULE, EXTENDED RELEASE ORAL
Qty: 60 CAPSULE | Refills: 0 | Status: SHIPPED | OUTPATIENT
Start: 2021-09-24 | End: 2021-10-21 | Stop reason: SDUPTHER

## 2021-10-21 DIAGNOSIS — F90.2 ATTENTION DEFICIT HYPERACTIVITY DISORDER (ADHD), COMBINED TYPE: Chronic | ICD-10-CM

## 2021-10-21 NOTE — TELEPHONE ENCOUNTER
Rx Refill Note  Requested Prescriptions     Pending Prescriptions Disp Refills   • amphetamine-dextroamphetamine XR (ADDERALL XR) 30 MG 24 hr capsule 60 capsule 0     Si cap po at 8 AM and 3 PM      Last office visit with prescribing clinician: 2021      Next office visit with prescribing clinician: 2021   Office Visit with Hafsa Oneal MD (2021)       Urine Drug Screen - Urine, Clean Catch (10/27/2020)      Mimi Ramsey MA  10/21/21, 15:25 EDT     Inspect printed

## 2021-10-22 RX ORDER — DEXTROAMPHETAMINE SACCHARATE, AMPHETAMINE ASPARTATE MONOHYDRATE, DEXTROAMPHETAMINE SULFATE AND AMPHETAMINE SULFATE 7.5; 7.5; 7.5; 7.5 MG/1; MG/1; MG/1; MG/1
CAPSULE, EXTENDED RELEASE ORAL
Qty: 60 CAPSULE | Refills: 0 | Status: SHIPPED | OUTPATIENT
Start: 2021-10-22 | End: 2021-11-22 | Stop reason: SDUPTHER

## 2021-11-22 DIAGNOSIS — F90.2 ATTENTION DEFICIT HYPERACTIVITY DISORDER (ADHD), COMBINED TYPE: Chronic | ICD-10-CM

## 2021-11-22 NOTE — TELEPHONE ENCOUNTER
Rx Refill Note  Requested Prescriptions     Pending Prescriptions Disp Refills   • amphetamine-dextroamphetamine XR (ADDERALL XR) 30 MG 24 hr capsule 60 capsule 0     Si cap po at 8 AM and 3 PM      Last office visit with prescribing clinician: 2021      Next office visit with prescribing clinician: 2021   Office Visit with Hafsa Oneal MD (2021)           Mimi Ramsey MA  21, 15:27 EST       Inspect printed

## 2021-11-23 RX ORDER — DEXTROAMPHETAMINE SACCHARATE, AMPHETAMINE ASPARTATE MONOHYDRATE, DEXTROAMPHETAMINE SULFATE AND AMPHETAMINE SULFATE 7.5; 7.5; 7.5; 7.5 MG/1; MG/1; MG/1; MG/1
CAPSULE, EXTENDED RELEASE ORAL
Qty: 60 CAPSULE | Refills: 0 | Status: SHIPPED | OUTPATIENT
Start: 2021-11-23 | End: 2021-12-20 | Stop reason: SDUPTHER

## 2021-12-20 DIAGNOSIS — F90.2 ATTENTION DEFICIT HYPERACTIVITY DISORDER (ADHD), COMBINED TYPE: Chronic | ICD-10-CM

## 2021-12-20 NOTE — TELEPHONE ENCOUNTER
Rx Refill Note  Requested Prescriptions     Pending Prescriptions Disp Refills   • amphetamine-dextroamphetamine XR (ADDERALL XR) 30 MG 24 hr capsule 60 capsule 0     Si cap po at 8 AM and 3 PM      Last office visit with prescribing clinician: 2021      Next office visit with prescribing clinician: 2021   Office Visit with Hafsa Oneal MD (2021)       Urine Drug Screen - Urine, Clean Catch (10/27/2020)      Mimi Ramsey MA  21, 15:20 EST     Printed inspect

## 2021-12-21 RX ORDER — DEXTROAMPHETAMINE SACCHARATE, AMPHETAMINE ASPARTATE MONOHYDRATE, DEXTROAMPHETAMINE SULFATE AND AMPHETAMINE SULFATE 7.5; 7.5; 7.5; 7.5 MG/1; MG/1; MG/1; MG/1
CAPSULE, EXTENDED RELEASE ORAL
Qty: 60 CAPSULE | Refills: 0 | Status: SHIPPED | OUTPATIENT
Start: 2021-12-21 | End: 2022-01-18 | Stop reason: SDUPTHER

## 2022-01-18 DIAGNOSIS — F90.2 ATTENTION DEFICIT HYPERACTIVITY DISORDER (ADHD), COMBINED TYPE: Chronic | ICD-10-CM

## 2022-01-18 NOTE — TELEPHONE ENCOUNTER
Rx Refill Note  Requested Prescriptions     Pending Prescriptions Disp Refills   • amphetamine-dextroamphetamine XR (ADDERALL XR) 30 MG 24 hr capsule 60 capsule 0     Si cap po at 8 AM and 3 PM      Last office visit with prescribing clinician: 2021      Next office visit with prescribing clinician: 3/25/2022   Office Visit with Hafsa Oneal MD (2021)           Trish Gacria MA  22, 15:30 EST     INSPECT RAN

## 2022-01-20 RX ORDER — DEXTROAMPHETAMINE SACCHARATE, AMPHETAMINE ASPARTATE MONOHYDRATE, DEXTROAMPHETAMINE SULFATE AND AMPHETAMINE SULFATE 7.5; 7.5; 7.5; 7.5 MG/1; MG/1; MG/1; MG/1
CAPSULE, EXTENDED RELEASE ORAL
Qty: 60 CAPSULE | Refills: 0 | Status: SHIPPED | OUTPATIENT
Start: 2022-01-20 | End: 2022-02-17 | Stop reason: SDUPTHER

## 2022-02-17 DIAGNOSIS — F90.2 ATTENTION DEFICIT HYPERACTIVITY DISORDER (ADHD), COMBINED TYPE: Chronic | ICD-10-CM

## 2022-02-17 NOTE — TELEPHONE ENCOUNTER
Rx Refill Note  Requested Prescriptions     Pending Prescriptions Disp Refills   • amphetamine-dextroamphetamine XR (ADDERALL XR) 30 MG 24 hr capsule 60 capsule 0     Si cap po at 8 AM and 3 PM      Last office visit with prescribing clinician: 2021      Next office visit with prescribing clinician: 3/25/2022   Office Visit with Hafsa Oneal MD (2021)       SCANNED - LABS (10/06/2020)      Mimi Ramsey MA  22, 14:50 EST       Inspect printed

## 2022-02-18 RX ORDER — DEXTROAMPHETAMINE SACCHARATE, AMPHETAMINE ASPARTATE MONOHYDRATE, DEXTROAMPHETAMINE SULFATE AND AMPHETAMINE SULFATE 7.5; 7.5; 7.5; 7.5 MG/1; MG/1; MG/1; MG/1
CAPSULE, EXTENDED RELEASE ORAL
Qty: 60 CAPSULE | Refills: 0 | Status: SHIPPED | OUTPATIENT
Start: 2022-02-18 | End: 2022-03-17 | Stop reason: SDUPTHER

## 2022-03-01 ENCOUNTER — TELEPHONE (OUTPATIENT)
Dept: PSYCHIATRY | Facility: CLINIC | Age: 32
End: 2022-03-01

## 2022-03-17 DIAGNOSIS — F90.2 ATTENTION DEFICIT HYPERACTIVITY DISORDER (ADHD), COMBINED TYPE: Chronic | ICD-10-CM

## 2022-03-17 NOTE — TELEPHONE ENCOUNTER
Rx Refill Note  Requested Prescriptions     Pending Prescriptions Disp Refills   • amphetamine-dextroamphetamine XR (ADDERALL XR) 30 MG 24 hr capsule 60 capsule 0     Si cap po at 8 AM and 3 PM      Last office visit with prescribing clinician: 2021      Next office visit with prescribing clinician: 3/25/2022            Kimberley Garcia MA  22, 15:09 EDT

## 2022-03-18 RX ORDER — DEXTROAMPHETAMINE SACCHARATE, AMPHETAMINE ASPARTATE MONOHYDRATE, DEXTROAMPHETAMINE SULFATE AND AMPHETAMINE SULFATE 7.5; 7.5; 7.5; 7.5 MG/1; MG/1; MG/1; MG/1
CAPSULE, EXTENDED RELEASE ORAL
Qty: 60 CAPSULE | Refills: 0 | Status: SHIPPED | OUTPATIENT
Start: 2022-03-18 | End: 2022-04-19 | Stop reason: SDUPTHER

## 2022-03-18 NOTE — TELEPHONE ENCOUNTER
3-15-22 @ 9:36am called pt; spoke with girlfriend who said he was sleeping and to call back after 2:30pm before he leaves for work and he might have to be at his job at 3pm.  Called 1:40pm and 2:30 and LVM on both listed phone numbers.  Pt did not return phone call, so send letter for pt to call office.

## 2022-03-25 ENCOUNTER — OFFICE VISIT (OUTPATIENT)
Dept: PSYCHIATRY | Facility: CLINIC | Age: 32
End: 2022-03-25

## 2022-03-25 DIAGNOSIS — Z79.899 ENCOUNTER FOR LONG-TERM (CURRENT) USE OF OTHER MEDICATIONS: Primary | ICD-10-CM

## 2022-03-25 DIAGNOSIS — F90.2 ATTENTION DEFICIT HYPERACTIVITY DISORDER (ADHD), COMBINED TYPE: Chronic | ICD-10-CM

## 2022-03-25 PROCEDURE — 99213 OFFICE O/P EST LOW 20 MIN: CPT | Performed by: PSYCHIATRY & NEUROLOGY

## 2022-03-25 NOTE — PROGRESS NOTES
Subjective   Kan Saavedra is a 31 y.o. male who presents today for follow up       Chief Complaint:   Decreased concentration     History of Present Illness:   The patient was diagnosed with ADHD when he was a child, he was stable on medications,  When on medications he is able to stay focused to finish the project   mood is stable, denied feeling hopeless/helpless, denied AVH  No anxiety related to meds    Today the pt reported feeling stable on meds, work is stressful in general, working evenings and nights   he is taking care of his family, school and work , his daughter has celiac disease     The pt is  on adderall XR 2 times per day , when on meds - concentration is better , more organized, more productive , but sometimes  Difficult to take 2 tabs   Sleep - some bouts of insomnia (when shifts change)  but overall sufficient   The pt denied al drug use     The following portions of the patient's history were reviewed and updated as appropriate: allergies, current medications, past family history, past medical history, past social history, past surgical history and problem list.  PAST PSYCHIATRIC HISTORY      Previous Psychiatric Diagnoses   Axis I: Attention Deficit Disorder     Past Hospitalizations or Residential Treatment   Locations\Providers: none      Past Outpatient Treatment   Diagnosis Treated: Affective Disorder, Anxiety/Panic Dis.  Treatment Type: Medication Management  Location: in school - psych      Prior Psychiatric Medications   Comments: ritalin since 2 grade, then taken off due to side effects      concerta - no difference      adderall - was rxd but did nto take it     viibryd - made numb      vyvanse was effective      Consequences of Mental Disorder   Consequences: family disruption, emotional distress     SOCIAL HISTORY   Single  Number of marriages: 0  Number of children: 1  Current Relationship is: unstable  Family of Origin is: supportive     Current Living Situation   Lives with:  children, significant other     Education   Level: some college     Employment   Job Status: full-time     Hobbies and Leisure Activities   Activity Type: quiet activities     Alcohol use   Freq. drinking: <1  Smoking History   Smoking Hx: Current every day smoker  Pack per day: 1     Exercise   Exercise sessions (per wk): 2     Illicit Drug Use   Illicit Drugs used: none     FAMILY HISTORY OF MENTAL DISORDERS   fh Grandparents: Non-contributory  fh Mother: Affective Disorders  fh Father: Non-contributory  fh Siblings: Non-contributory  fh Other: Non-contributory             Interval History  No Change, stable     Side Effects  Denied       Past Medical History:  Past Medical History:   Diagnosis Date   • ADHD (attention deficit hyperactivity disorder)        Past Surgical History:  History reviewed. No pertinent surgical history.    Problem List:  Patient Active Problem List   Diagnosis   • Attention deficit hyperactivity disorder (ADHD), combined type       Allergy:   No Known Allergies     Discontinued Medications:  There are no discontinued medications.    Current Medications:   Current Outpatient Medications   Medication Sig Dispense Refill   • amphetamine-dextroamphetamine XR (ADDERALL XR) 30 MG 24 hr capsule 1 cap po at 8 AM and 3 PM 60 capsule 0     No current facility-administered medications for this visit.         Review of Symptoms:    Psychiatric/Behavioral: Negative for agitation, behavioral problems, confusion, decreased concentration, dysphoric mood, hallucinations, self-injury, sleep disturbance and suicidal ideas.   The patient is not depressed, not nervous/anxious and is not hyperactive.        Physical Exam:   There were no vitals taken for this visit.    Mental Status Exam:   Hygiene:   good  Cooperation:  Cooperative  Eye Contact:  Good  Psychomotor Behavior:  Appropriate  Affect:  Appropriate   Mood: euthymic  Hopelessness: Denies  Speech:  Normal  Thought Process:  Goal directed and  Linear  Thought Content:  Normal  Suicidal:  None  Homicidal:  None  Hallucinations:  None  Delusion:  None  Memory:  Intact  Orientation:  Person, Place, Time and Situation  Reliability:  good  Insight:  Good  Judgement:  Good  Impulse Control:  Fair  Physical/Medical Issues:  No      MSE from  8/26/2021  reviewed and no changes necessary     PHQ-9 Depression Screening  Little interest or pleasure in doing things? 1-->several days   Feeling down, depressed, or hopeless? 1-->several days   Trouble falling or staying asleep, or sleeping too much? 2-->more than half the days   Feeling tired or having little energy? 2-->more than half the days   Poor appetite or overeating? 0-->not at all   Feeling bad about yourself - or that you are a failure or have let yourself or your family down? 1-->several days   Trouble concentrating on things, such as reading the newspaper or watching television? 2-->more than half the days   Moving or speaking so slowly that other people could have noticed? Or the opposite - being so fidgety or restless that you have been moving around a lot more than usual? 0-->not at all   Thoughts that you would be better off dead, or of hurting yourself in some way? 0-->not at all   PHQ-9 Total Score 9   If you checked off any problems, how difficult have these problems made it for you to do your work, take care of things at home, or get along with other people? very difficult           Former smoker    I advised Kan of the risks of tobacco use.     Lab Results:   No visits with results within 3 Month(s) from this visit.   Latest known visit with results is:   No results found for any previous visit.       Assessment/Plan   Problems Addressed this Visit        Mental Health    Attention deficit hyperactivity disorder (ADHD), combined type (Chronic)      Other Visit Diagnoses     Encounter for long-term (current) use of other medications    -  Primary    Relevant Orders    [facility] Urine Drug Screen -       Diagnoses       Codes Comments    Encounter for long-term (current) use of other medications    -  Primary ICD-10-CM: Z79.899  ICD-9-CM: V58.69     Attention deficit hyperactivity disorder (ADHD), combined type     ICD-10-CM: F90.2  ICD-9-CM: 314.01           Visit Diagnoses:    ICD-10-CM ICD-9-CM   1. Encounter for long-term (current) use of other medications  Z79.899 V58.69   2. Attention deficit hyperactivity disorder (ADHD), combined type  F90.2 314.01       TREATMENT PLAN/GOALS: Continue supportive psychotherapy efforts and medications as indicated. Treatment and medication options discussed during today's visit. Patient ackowledged and verbally consented to continue with current treatment plan and was educated on the importance of compliance with treatment and follow-up appointments.    MEDICATION ISSUES:  1. ADHD inattentive - Cont current meds, stable on meds, no side effects , weight stable, but the pt wants to try mydayis when due for the next refill    He was on vyvanse in the past but it was not covered by his ins, they changed insurance now and he is thinking to switch back to      UDS 10/27/2020  Consistent, will repeat today      PHQ scored 9  and indicated mild depression   JOSE 7 scored 6     INSPECT reviewed as expected  - last filled  On 3/20/22  will fill when it is due        Discussed medication options and treatment plan of prescribed medication as well as the risks, benefits, and side effects including potential falls, possible impaired driving and metabolic adversities among others. Patient is agreeable to call the office with any worsening of symptoms or onset of side effects. Patient is agreeable to call 911 or go to the nearest ER should he/she begin having SI/HI. No medication side effects or related complaints today.     MEDS ORDERED DURING VISIT:  No orders of the defined types were placed in this encounter.  refill was sent on 4/28/2021     Return in about 4 months (around  7/25/2022).       This document has been electronically signed by Hafsa Oneal MD  March 25, 2022 11:17 EDT

## 2022-04-19 DIAGNOSIS — F90.2 ATTENTION DEFICIT HYPERACTIVITY DISORDER (ADHD), COMBINED TYPE: Chronic | ICD-10-CM

## 2022-04-19 RX ORDER — DEXTROAMPHETAMINE SACCHARATE, AMPHETAMINE ASPARTATE MONOHYDRATE, DEXTROAMPHETAMINE SULFATE AND AMPHETAMINE SULFATE 7.5; 7.5; 7.5; 7.5 MG/1; MG/1; MG/1; MG/1
CAPSULE, EXTENDED RELEASE ORAL
Qty: 60 CAPSULE | Refills: 0 | Status: SHIPPED | OUTPATIENT
Start: 2022-04-19 | End: 2022-05-17 | Stop reason: SDUPTHER

## 2022-05-17 DIAGNOSIS — F90.2 ATTENTION DEFICIT HYPERACTIVITY DISORDER (ADHD), COMBINED TYPE: Chronic | ICD-10-CM

## 2022-05-17 NOTE — TELEPHONE ENCOUNTER
Rx Refill Note  Requested Prescriptions     Pending Prescriptions Disp Refills   • amphetamine-dextroamphetamine XR (ADDERALL XR) 30 MG 24 hr capsule 60 capsule 0     Si cap po at 8 AM and 3 PM      Last office visit with prescribing clinician: 3/25/2022      Next office visit with prescribing clinician: 2022     Office Visit with Hafsa Oneal MD (2022)     [facility] Urine Drug Screen - (2022)      Mimi Ramsey MA  22, 15:22 EDT     Inspect printed

## 2022-05-18 RX ORDER — DEXTROAMPHETAMINE SACCHARATE, AMPHETAMINE ASPARTATE MONOHYDRATE, DEXTROAMPHETAMINE SULFATE AND AMPHETAMINE SULFATE 7.5; 7.5; 7.5; 7.5 MG/1; MG/1; MG/1; MG/1
CAPSULE, EXTENDED RELEASE ORAL
Qty: 60 CAPSULE | Refills: 0 | Status: SHIPPED | OUTPATIENT
Start: 2022-05-18 | End: 2022-06-14 | Stop reason: SDUPTHER

## 2022-06-14 DIAGNOSIS — F90.2 ATTENTION DEFICIT HYPERACTIVITY DISORDER (ADHD), COMBINED TYPE: Chronic | ICD-10-CM

## 2022-06-14 NOTE — TELEPHONE ENCOUNTER
Rx Refill Note  Requested Prescriptions     Pending Prescriptions Disp Refills   • amphetamine-dextroamphetamine XR (ADDERALL XR) 30 MG 24 hr capsule 60 capsule 0     Si cap po at 8 AM and 3 PM      Last office visit with prescribing clinician: 3/25/2022      Next office visit with prescribing clinician: 2022            Mimi Ramsey MA  22, 16:15 EDT

## 2022-06-16 RX ORDER — DEXTROAMPHETAMINE SACCHARATE, AMPHETAMINE ASPARTATE MONOHYDRATE, DEXTROAMPHETAMINE SULFATE AND AMPHETAMINE SULFATE 7.5; 7.5; 7.5; 7.5 MG/1; MG/1; MG/1; MG/1
CAPSULE, EXTENDED RELEASE ORAL
Qty: 60 CAPSULE | Refills: 0 | Status: SHIPPED | OUTPATIENT
Start: 2022-06-16 | End: 2022-07-14 | Stop reason: SDUPTHER

## 2022-07-14 DIAGNOSIS — F90.2 ATTENTION DEFICIT HYPERACTIVITY DISORDER (ADHD), COMBINED TYPE: Chronic | ICD-10-CM

## 2022-07-14 RX ORDER — DEXTROAMPHETAMINE SACCHARATE, AMPHETAMINE ASPARTATE MONOHYDRATE, DEXTROAMPHETAMINE SULFATE AND AMPHETAMINE SULFATE 7.5; 7.5; 7.5; 7.5 MG/1; MG/1; MG/1; MG/1
CAPSULE, EXTENDED RELEASE ORAL
Qty: 60 CAPSULE | Refills: 0 | Status: SHIPPED | OUTPATIENT
Start: 2022-07-14 | End: 2022-08-11 | Stop reason: SDUPTHER

## 2022-07-14 NOTE — TELEPHONE ENCOUNTER
Rx Refill Note  Requested Prescriptions     Pending Prescriptions Disp Refills   • amphetamine-dextroamphetamine XR (ADDERALL XR) 30 MG 24 hr capsule 60 capsule 0     Si cap po at 8 AM and 3 PM      Last office visit with prescribing clinician: 3/25/2022      Next office visit with prescribing clinician: 2022   Office Visit with Hafsa Oneal MD (2022)       [facility] Urine Drug Screen - (2022)      Mimi Ramsey MA  22, 14:35 EDT     Inspect printed

## 2022-08-11 DIAGNOSIS — F90.2 ATTENTION DEFICIT HYPERACTIVITY DISORDER (ADHD), COMBINED TYPE: Chronic | ICD-10-CM

## 2022-08-11 RX ORDER — DEXTROAMPHETAMINE SACCHARATE, AMPHETAMINE ASPARTATE MONOHYDRATE, DEXTROAMPHETAMINE SULFATE AND AMPHETAMINE SULFATE 7.5; 7.5; 7.5; 7.5 MG/1; MG/1; MG/1; MG/1
CAPSULE, EXTENDED RELEASE ORAL
Qty: 60 CAPSULE | Refills: 0 | Status: SHIPPED | OUTPATIENT
Start: 2022-08-11 | End: 2022-09-09 | Stop reason: SDUPTHER

## 2022-08-11 NOTE — TELEPHONE ENCOUNTER
Rx Refill Note  Requested Prescriptions     Pending Prescriptions Disp Refills   • amphetamine-dextroamphetamine XR (ADDERALL XR) 30 MG 24 hr capsule 60 capsule 0     Si cap po at 8 AM and 3 PM      Last office visit with prescribing clinician: 3/25/2022      Next office visit with prescribing clinician: 2022   Office Visit with Hafsa Oneal MD (2022)       [facility] Urine Drug Screen - (2022)      Mimi Ramsey MA  22, 15:55 EDT     Inspect printed

## 2022-08-22 NOTE — TELEPHONE ENCOUNTER
Pt presents to ED with mother for difficult/painful swallowing. Pt states she tested positive Friday for covid, symptoms are unmanageable at home with otc meds.       Nae Gonzalez  08/22/22 2850 Yes generic adderall XR is covered

## 2022-09-09 DIAGNOSIS — F90.2 ATTENTION DEFICIT HYPERACTIVITY DISORDER (ADHD), COMBINED TYPE: Chronic | ICD-10-CM

## 2022-09-09 RX ORDER — DEXTROAMPHETAMINE SACCHARATE, AMPHETAMINE ASPARTATE MONOHYDRATE, DEXTROAMPHETAMINE SULFATE AND AMPHETAMINE SULFATE 7.5; 7.5; 7.5; 7.5 MG/1; MG/1; MG/1; MG/1
CAPSULE, EXTENDED RELEASE ORAL
Qty: 60 CAPSULE | Refills: 0 | Status: SHIPPED | OUTPATIENT
Start: 2022-09-09 | End: 2022-10-11 | Stop reason: SDUPTHER

## 2022-09-09 NOTE — TELEPHONE ENCOUNTER
Rx Refill Note  Requested Prescriptions     Pending Prescriptions Disp Refills   • amphetamine-dextroamphetamine XR (ADDERALL XR) 30 MG 24 hr capsule 60 capsule 0     Si cap po at 8 AM and 3 PM      Last office visit with prescribing clinician: 3/25/2022      Next office visit with prescribing clinician: 2022   Office Visit with Hafsa Oneal MD (2022)       [facility] Urine Drug Screen - (2022)      Mimi Ramsey MA  22, 09:17 EDT     Inspect printed; last fill 22

## 2022-10-04 NOTE — TELEPHONE ENCOUNTER
Duplicate request - already responded to by provider   What Is The Reason For Today's Visit?: Full Body Skin Examination

## 2022-10-11 DIAGNOSIS — F90.2 ATTENTION DEFICIT HYPERACTIVITY DISORDER (ADHD), COMBINED TYPE: Chronic | ICD-10-CM

## 2022-10-11 RX ORDER — DEXTROAMPHETAMINE SACCHARATE, AMPHETAMINE ASPARTATE MONOHYDRATE, DEXTROAMPHETAMINE SULFATE AND AMPHETAMINE SULFATE 7.5; 7.5; 7.5; 7.5 MG/1; MG/1; MG/1; MG/1
CAPSULE, EXTENDED RELEASE ORAL
Qty: 60 CAPSULE | Refills: 0 | Status: SHIPPED | OUTPATIENT
Start: 2022-10-11 | End: 2022-11-08 | Stop reason: SDUPTHER

## 2022-10-11 NOTE — TELEPHONE ENCOUNTER
Rx Refill Note  Requested Prescriptions     Pending Prescriptions Disp Refills   • amphetamine-dextroamphetamine XR (ADDERALL XR) 30 MG 24 hr capsule 60 capsule 0     Si cap po at 8 AM and 3 PM      Last office visit with prescribing clinician: 3/25/2022      Next office visit with prescribing clinician: 2022   Office Visit with Hafsa Oneal MD (2022)       [facility] Urine Drug Screen - (2022)      Mimi Ramsey MA  10/11/22, 09:23 EDT     Last fill 22; inspect printed

## 2022-11-08 DIAGNOSIS — F90.2 ATTENTION DEFICIT HYPERACTIVITY DISORDER (ADHD), COMBINED TYPE: Chronic | ICD-10-CM

## 2022-11-08 NOTE — TELEPHONE ENCOUNTER
Rx Refill Note  Requested Prescriptions     Pending Prescriptions Disp Refills   • amphetamine-dextroamphetamine XR (ADDERALL XR) 30 MG 24 hr capsule 60 capsule 0     Si cap po at 8 AM and 3 PM      Last office visit with prescribing clinician: 3/25/2022      Next office visit with prescribing clinician: 2022   Office Visit with Hafsa Oneal MD (2022)       [facility] Urine Drug Screen - (2022)      Mimi Ramsey MA  22, 14:16 EST     Inspect printed; last fill 10-

## 2022-11-09 RX ORDER — DEXTROAMPHETAMINE SACCHARATE, AMPHETAMINE ASPARTATE MONOHYDRATE, DEXTROAMPHETAMINE SULFATE AND AMPHETAMINE SULFATE 7.5; 7.5; 7.5; 7.5 MG/1; MG/1; MG/1; MG/1
CAPSULE, EXTENDED RELEASE ORAL
Qty: 60 CAPSULE | Refills: 0 | Status: SHIPPED | OUTPATIENT
Start: 2022-11-09 | End: 2022-12-07 | Stop reason: SDUPTHER

## 2022-12-07 ENCOUNTER — OFFICE VISIT (OUTPATIENT)
Dept: PSYCHIATRY | Facility: CLINIC | Age: 32
End: 2022-12-07

## 2022-12-07 DIAGNOSIS — F90.2 ATTENTION DEFICIT HYPERACTIVITY DISORDER (ADHD), COMBINED TYPE: Primary | Chronic | ICD-10-CM

## 2022-12-07 PROCEDURE — 99213 OFFICE O/P EST LOW 20 MIN: CPT | Performed by: PSYCHIATRY & NEUROLOGY

## 2022-12-07 RX ORDER — DEXTROAMPHETAMINE SACCHARATE, AMPHETAMINE ASPARTATE MONOHYDRATE, DEXTROAMPHETAMINE SULFATE AND AMPHETAMINE SULFATE 7.5; 7.5; 7.5; 7.5 MG/1; MG/1; MG/1; MG/1
CAPSULE, EXTENDED RELEASE ORAL
Qty: 60 CAPSULE | Refills: 0 | Status: SHIPPED | OUTPATIENT
Start: 2022-12-07 | End: 2023-01-09 | Stop reason: SDUPTHER

## 2022-12-07 NOTE — PROGRESS NOTES
Subjective   Kan Saavedra is a 32 y.o. male who presents today for follow up       Chief Complaint:   Decreased concentration     History of Present Illness:   The patient was diagnosed with ADHD when he was a child, he was stable on medications,  When on medications he is able to stay focused to finish the project   mood is stable, denied feeling hopeless/helpless, denied AVH  No anxiety related to meds    Today the pt reported feeling stable on meds, just stress of daily life, car broke in very inappropriate time,  work is stressful in general, working evenings and nights   he is taking care of his family,   work , his daughter has celiac disease, he postponed school for now      The pt is  on adderall XR 2 times per day , when on meds - concentration is better , more organized, more productive , but sometimes  Difficult to take 2 tabs , but vyvanse was not covered by ins   Sleep - fragmented  but overall sufficient   The pt denied al drug use     The following portions of the patient's history were reviewed and updated as appropriate: allergies, current medications, past family history, past medical history, past social history, past surgical history and problem list.  PAST PSYCHIATRIC HISTORY      Previous Psychiatric Diagnoses   Axis I: Attention Deficit Disorder     Past Hospitalizations or Residential Treatment   Locations\Providers: none      Past Outpatient Treatment   Diagnosis Treated: Affective Disorder, Anxiety/Panic Dis.  Treatment Type: Medication Management  Location: in school - psych      Prior Psychiatric Medications   Comments: ritalin since 2 grade, then taken off due to side effects      concerta - no difference      adderall - was rxd but did nto take it     viibryd - made numb      vyvanse was effective      Consequences of Mental Disorder   Consequences: family disruption, emotional distress     SOCIAL HISTORY   Single  Number of marriages: 0  Number of children: 1  Current Relationship  is: unstable  Family of Origin is: supportive     Current Living Situation   Lives with: children, significant other     Education   Level: some college     Employment   Job Status: full-time     Hobbies and Leisure Activities   Activity Type: quiet activities     Alcohol use   Freq. drinking: <1  Smoking History   Smoking Hx: Current every day smoker  Pack per day: 1     Exercise   Exercise sessions (per wk): 2     Illicit Drug Use   Illicit Drugs used: none     FAMILY HISTORY OF MENTAL DISORDERS   fh Grandparents: Non-contributory  fh Mother: Affective Disorders  fh Father: Non-contributory  fh Siblings: Non-contributory  fh Other: Non-contributory             Interval History  No Change, stable     Side Effects  Denied       Past Medical History:  Past Medical History:   Diagnosis Date   • ADHD (attention deficit hyperactivity disorder)        Past Surgical History:  History reviewed. No pertinent surgical history.    Problem List:  Patient Active Problem List   Diagnosis   • Attention deficit hyperactivity disorder (ADHD), combined type       Allergy:   No Known Allergies     Discontinued Medications:  Medications Discontinued During This Encounter   Medication Reason   • amphetamine-dextroamphetamine XR (ADDERALL XR) 30 MG 24 hr capsule Reorder       Current Medications:   Current Outpatient Medications   Medication Sig Dispense Refill   • amphetamine-dextroamphetamine XR (ADDERALL XR) 30 MG 24 hr capsule 1 cap po at 8 AM and 3 PM 60 capsule 0     No current facility-administered medications for this visit.         Review of Symptoms:    Psychiatric/Behavioral: Negative for agitation, behavioral problems, confusion, decreased concentration, dysphoric mood, hallucinations, self-injury, sleep disturbance and suicidal ideas.   The patient is not depressed, not nervous/anxious and is not hyperactive.        Physical Exam:   There were no vitals taken for this visit.    Mental Status Exam:   Hygiene:    good  Cooperation:  Cooperative  Eye Contact:  Good  Psychomotor Behavior:  Appropriate  Affect:  Appropriate   Mood: euthymic  Hopelessness: Denies  Speech:  Normal  Thought Process:  Goal directed and Linear  Thought Content:  Normal  Suicidal:  None  Homicidal:  None  Hallucinations:  None  Delusion:  None  Memory:  Intact  Orientation:  Person, Place, Time and Situation  Reliability:  good  Insight:  Good  Judgement:  Good  Impulse Control:  Fair  Physical/Medical Issues:  No      MSE from  3/25/2022   reviewed and no changes necessary     PHQ-9 Depression Screening  Little interest or pleasure in doing things? 1-->several days   Feeling down, depressed, or hopeless? 1-->several days   Trouble falling or staying asleep, or sleeping too much? 0-->not at all   Feeling tired or having little energy? 2-->more than half the days   Poor appetite or overeating? 0-->not at all   Feeling bad about yourself - or that you are a failure or have let yourself or your family down? 1-->several days   Trouble concentrating on things, such as reading the newspaper or watching television? 2-->more than half the days   Moving or speaking so slowly that other people could have noticed? Or the opposite - being so fidgety or restless that you have been moving around a lot more than usual? 0-->not at all   Thoughts that you would be better off dead, or of hurting yourself in some way? 0-->not at all   PHQ-9 Total Score 7   If you checked off any problems, how difficult have these problems made it for you to do your work, take care of things at home, or get along with other people? very difficult           Former smoker    I advised Kan of the risks of tobacco use.     Lab Results:   No visits with results within 3 Month(s) from this visit.   Latest known visit with results is:   No results found for any previous visit.       Assessment & Plan   Problems Addressed this Visit        Mental Health    Attention deficit hyperactivity  disorder (ADHD), combined type - Primary (Chronic)    Relevant Medications    amphetamine-dextroamphetamine XR (ADDERALL XR) 30 MG 24 hr capsule   Diagnoses       Codes Comments    Attention deficit hyperactivity disorder (ADHD), combined type    -  Primary ICD-10-CM: F90.2  ICD-9-CM: 314.01           Visit Diagnoses:    ICD-10-CM ICD-9-CM   1. Attention deficit hyperactivity disorder (ADHD), combined type  F90.2 314.01       TREATMENT PLAN/GOALS: Continue supportive psychotherapy efforts and medications as indicated. Treatment and medication options discussed during today's visit. Patient ackowledged and verbally consented to continue with current treatment plan and was educated on the importance of compliance with treatment and follow-up appointments.    MEDICATION ISSUES:  1. ADHD inattentive - Cont current meds, stable on meds, no side effects , weight stable, but when he gets new ins he wants to go back on vyvanse    He was on vyvanse in the past but it was not covered by his ins, they changed insurance now and he is thinking to switch back to      UDS 10/27/2020  Consistent, 3/25/2022 consistent       PHQ scored 7  and indicated mild depression   JOSE 7 scored 5      INSPECT reviewed as expected adderal - last filled  On 22            Discussed medication options and treatment plan of prescribed medication as well as the risks, benefits, and side effects including potential falls, possible impaired driving and metabolic adversities among others. Patient is agreeable to call the office with any worsening of symptoms or onset of side effects. Patient is agreeable to call 911 or go to the nearest ER should he/she begin having SI/HI. No medication side effects or related complaints today.     MEDS ORDERED DURING VISIT:  New Medications Ordered This Visit   Medications   • amphetamine-dextroamphetamine XR (ADDERALL XR) 30 MG 24 hr capsule     Si cap po at 8 AM and 3 PM     Dispense:  60 capsule     Refill:   0        Return in about 4 months (around 4/7/2023).       This document has been electronically signed by Hafsa Oneal MD  December 7, 2022 09:00 EST

## 2023-01-09 DIAGNOSIS — F90.2 ATTENTION DEFICIT HYPERACTIVITY DISORDER (ADHD), COMBINED TYPE: Chronic | ICD-10-CM

## 2023-01-09 NOTE — TELEPHONE ENCOUNTER
Rx Refill Note  Requested Prescriptions     Pending Prescriptions Disp Refills   • amphetamine-dextroamphetamine XR (ADDERALL XR) 30 MG 24 hr capsule 60 capsule 0     Si cap po at 8 AM and 3 PM      Last office visit with prescribing clinician: 2022   Last telemedicine visit with prescribing clinician: 2023   Next office visit with prescribing clinician: 2023   Office Visit with Hafsa Oneal MD (2022)       [facility] Urine Drug Screen - (2022)                   Would you like a call back once the refill request has been completed: [] Yes [] No    If the office needs to give you a call back, can they leave a voicemail: [] Yes [] No    Mimi Ramsey MA  23, 12:51 EST     Pt says [facility] in [place] has Adderall in stock; Inspect printed; last fill

## 2023-01-11 RX ORDER — DEXTROAMPHETAMINE SACCHARATE, AMPHETAMINE ASPARTATE MONOHYDRATE, DEXTROAMPHETAMINE SULFATE AND AMPHETAMINE SULFATE 7.5; 7.5; 7.5; 7.5 MG/1; MG/1; MG/1; MG/1
CAPSULE, EXTENDED RELEASE ORAL
Qty: 60 CAPSULE | Refills: 0 | Status: SHIPPED | OUTPATIENT
Start: 2023-01-11 | End: 2023-02-03 | Stop reason: SDUPTHER

## 2023-02-03 DIAGNOSIS — F90.2 ATTENTION DEFICIT HYPERACTIVITY DISORDER (ADHD), COMBINED TYPE: Chronic | ICD-10-CM

## 2023-02-03 RX ORDER — DEXTROAMPHETAMINE SACCHARATE, AMPHETAMINE ASPARTATE MONOHYDRATE, DEXTROAMPHETAMINE SULFATE AND AMPHETAMINE SULFATE 7.5; 7.5; 7.5; 7.5 MG/1; MG/1; MG/1; MG/1
CAPSULE, EXTENDED RELEASE ORAL
Qty: 60 CAPSULE | Refills: 0 | Status: SHIPPED | OUTPATIENT
Start: 2023-02-03 | End: 2023-03-08 | Stop reason: RX

## 2023-02-03 NOTE — TELEPHONE ENCOUNTER
Rx Refill Note  Requested Prescriptions     Pending Prescriptions Disp Refills   • amphetamine-dextroamphetamine XR (ADDERALL XR) 30 MG 24 hr capsule 60 capsule 0     Si cap po at 8 AM and 3 PM      Last office visit with prescribing clinician: 2022   Last telemedicine visit with prescribing clinician: 2023   Next office visit with prescribing clinician: 2023   Office Visit with Hafsa Oneal MD (2022)       [facility] Urine Drug Screen - (2022)                   Would you like a call back once the refill request has been completed: [] Yes [] No    If the office needs to give you a call back, can they leave a voicemail: [] Yes [] No    Mimi Ramsey MA  23, 15:47 EST     Pt says [facility] [place] has it; inspect printed; last fill 1-11; he says he is calling early to make sure he gets it while it is in stock

## 2023-03-08 ENCOUNTER — TELEPHONE (OUTPATIENT)
Dept: PSYCHIATRY | Facility: CLINIC | Age: 33
End: 2023-03-08

## 2023-03-08 DIAGNOSIS — F90.2 ATTENTION DEFICIT HYPERACTIVITY DISORDER (ADHD), COMBINED TYPE: Primary | ICD-10-CM

## 2023-03-08 NOTE — TELEPHONE ENCOUNTER
Pt says no pharmacies have his Adderall XR 30 mg capsules and he is willing to try Adzenys 18.8 mg in its place sent to his [facility] pharmacy in [place].    Inspect printed;  Last fill 2-10

## 2023-04-11 ENCOUNTER — TELEPHONE (OUTPATIENT)
Dept: PSYCHIATRY | Facility: CLINIC | Age: 33
End: 2023-04-11

## 2023-04-11 DIAGNOSIS — F90.2 ATTENTION DEFICIT HYPERACTIVITY DISORDER (ADHD), COMBINED TYPE: ICD-10-CM

## 2023-04-11 NOTE — TELEPHONE ENCOUNTER
Pt says he likes his Adzenys and he would like a refill sent to [facility] [place].    Inspect printed; last fill 3-14

## 2023-05-09 DIAGNOSIS — F90.2 ATTENTION DEFICIT HYPERACTIVITY DISORDER (ADHD), COMBINED TYPE: ICD-10-CM

## 2023-05-09 NOTE — TELEPHONE ENCOUNTER
Rx Refill Note  Requested Prescriptions     Pending Prescriptions Disp Refills   • Amphetamine ER (adZENys XR-ODT) 18.8 MG Tablet Extended Release Dispersible 60 each 0     Sig: Place 1 each on the tongue 2 (Two) Times a Day. At 8 AM and 3 PM      Last office visit with prescribing clinician: 12/7/2022   Last telemedicine visit with prescribing clinician: 4/11/2023   Next office visit with prescribing clinician: 6/2/2023   Office Visit with Hafsa Oneal MD (12/07/2022)      [facility] Urine Drug Screen - (03/25/2022)  }                  Would you like a call back once the refill request has been completed: [] Yes [] No    If the office needs to give you a call back, can they leave a voicemail: [] Yes [] No    Mimi Ramsey MA  05/09/23, 13:23 EDT     Inspect down;

## 2023-06-06 DIAGNOSIS — F90.2 ATTENTION DEFICIT HYPERACTIVITY DISORDER (ADHD), COMBINED TYPE: ICD-10-CM

## 2023-08-08 DIAGNOSIS — F90.2 ATTENTION DEFICIT HYPERACTIVITY DISORDER (ADHD), COMBINED TYPE: ICD-10-CM

## 2023-08-08 NOTE — TELEPHONE ENCOUNTER
Rx Refill Note  Requested Prescriptions     Pending Prescriptions Disp Refills    Amphetamine ER (adZENys XR-ODT) 18.8 MG Tablet Extended Release Dispersible 60 each 0     Sig: Place 1 each on the tongue 2 (Two) Times a Day. At 8 AM and 3 PM      Last office visit with prescribing clinician: 12/7/2022   Last telemedicine visit with prescribing clinician: Visit date not found   Next office visit with prescribing clinician: 8/17/2023   Office Visit with Hafsa Oneal MD (12/07/2022)   [facility] Urine Drug Screen - (03/25/2022)                     Would you like a call back once the refill request has been completed: [] Yes [] No    If the office needs to give you a call back, can they leave a voicemail: [] Yes [] No    Mimi Ramsey MA  08/08/23, 16:11 EDT    Last fill 7-13-23

## 2023-08-17 ENCOUNTER — OFFICE VISIT (OUTPATIENT)
Dept: PSYCHIATRY | Facility: CLINIC | Age: 33
End: 2023-08-17
Payer: COMMERCIAL

## 2023-08-17 DIAGNOSIS — F90.2 ATTENTION DEFICIT HYPERACTIVITY DISORDER (ADHD), COMBINED TYPE: Chronic | ICD-10-CM

## 2023-08-17 DIAGNOSIS — Z79.899 ENCOUNTER FOR LONG-TERM (CURRENT) USE OF OTHER MEDICATIONS: Primary | ICD-10-CM

## 2023-08-17 NOTE — PROGRESS NOTES
"Subjective   Kan Saavedra is a 33 y.o. male who presents today for follow up       Chief Complaint:   Decreased concentration , \"so many things happened\"     History of Present Illness:   The patient was diagnosed with ADHD when he was a child, he was stable on medications,  When on medications he is able to stay focused to finish the project   mood is stable, denied feeling hopeless/helpless, denied AVH  No anxiety related to meds    Today the pt reported having  increased anxiety, he himself had medical issues and cat was dsd with cancer, he feels overwhelmed, he still works in the stressful place, working evenings and nights     Depression is rated as 2-3/10 \"I don't have time to feel depressed\"   he is taking care of his family,   works full time      The pt was  on adderall XR 2 times per day , but due to shortage he was changed to Adzenys and found it more effective and less \"ups and downs\"     when on meds - concentration is better , more organized, more productive ,   Sleep - fragmented  but overall sufficient   The pt denied al drug use     The following portions of the patient's history were reviewed and updated as appropriate: allergies, current medications, past family history, past medical history, past social history, past surgical history and problem list.  PAST PSYCHIATRIC HISTORY      Previous Psychiatric Diagnoses   Axis I: Attention Deficit Disorder     Past Hospitalizations or Residential Treatment   Locations\Providers: none      Past Outpatient Treatment   Diagnosis Treated: Affective Disorder, Anxiety/Panic Dis.  Treatment Type: Medication Management  Location: in school - psych      Prior Psychiatric Medications   Comments: ritalin since 2 grade, then taken off due to side effects      concerta - no difference      adderall - was rxd but did nto take it     viibryd - made numb      vyvanse was effective      Consequences of Mental Disorder   Consequences: family disruption, emotional " distress     SOCIAL HISTORY   Single  Number of marriages: 0  Number of children: 1  Current Relationship is: unstable  Family of Origin is: supportive     Current Living Situation   Lives with: children, significant other     Education   Level: some college     Employment   Job Status: full-time     Hobbies and Leisure Activities   Activity Type: quiet activities     Alcohol use   Freq. drinking: <1  Smoking History   Smoking Hx: Current every day smoker  Pack per day: 1     Exercise   Exercise sessions (per wk): 2     Illicit Drug Use   Illicit Drugs used: none     FAMILY HISTORY OF MENTAL DISORDERS   fh Grandparents: Non-contributory  fh Mother: Affective Disorders  fh Father: Non-contributory  fh Siblings: Non-contributory  fh Other: Non-contributory             Interval History  No Change, stable     Side Effects  Denied       Past Medical History:  Past Medical History:   Diagnosis Date    ADHD (attention deficit hyperactivity disorder)        Past Surgical History:  No past surgical history on file.    Problem List:  Patient Active Problem List   Diagnosis    Attention deficit hyperactivity disorder (ADHD), combined type       Allergy:   No Known Allergies     Discontinued Medications:  Medications Discontinued During This Encounter   Medication Reason    Amphetamine ER (adZENys XR-ODT) 18.8 MG Tablet Extended Release Dispersible Reorder         Current Medications:   Current Outpatient Medications   Medication Sig Dispense Refill    Amphetamine ER (adZENys XR-ODT) 18.8 MG Tablet Extended Release Dispersible Place 1 each on the tongue 2 (Two) Times a Day. At 8 AM and 3 PM 60 each 0     No current facility-administered medications for this visit.         Review of Symptoms:    Psychiatric/Behavioral: Negative for agitation, behavioral problems, confusion, decreased concentration, dysphoric mood, hallucinations, self-injury, sleep disturbance and suicidal ideas.   The patient is not depressed, not  nervous/anxious and is not hyperactive.        Physical Exam:   There were no vitals taken for this visit.    Mental Status Exam:   Hygiene:   good  Cooperation:  Cooperative  Eye Contact:  Good  Psychomotor Behavior:  Appropriate  Affect:  Appropriate   Mood: euthymic  Hopelessness: Denies  Speech:  Normal  Thought Process:  Goal directed and Linear  Thought Content:  Normal  Suicidal:  None  Homicidal:  None  Hallucinations:  None  Delusion:  None  Memory:  Intact  Orientation:  Person, Place, Time and Situation  Reliability:  good  Insight:  Good  Judgement:  Good  Impulse Control:  Fair  Physical/Medical Issues:  No      MSE from  12/7/2022   reviewed and no changes necessary     PHQ-9 Depression Screening  Little interest or pleasure in doing things? 1-->several days   Feeling down, depressed, or hopeless? 0-->not at all   Trouble falling or staying asleep, or sleeping too much? 0-->not at all   Feeling tired or having little energy? 1-->several days   Poor appetite or overeating? 1-->several days   Feeling bad about yourself - or that you are a failure or have let yourself or your family down? 0-->not at all   Trouble concentrating on things, such as reading the newspaper or watching television? 2-->more than half the days   Moving or speaking so slowly that other people could have noticed? Or the opposite - being so fidgety or restless that you have been moving around a lot more than usual? 0-->not at all   Thoughts that you would be better off dead, or of hurting yourself in some way? 0-->not at all   PHQ-9 Total Score 5   If you checked off any problems, how difficult have these problems made it for you to do your work, take care of things at home, or get along with other people? somewhat difficult           Former smoker    I advised Kan of the risks of tobacco use.     Lab Results:   No visits with results within 3 Month(s) from this visit.   Latest known visit with results is:   No results found for  any previous visit.       Assessment & Plan   Problems Addressed this Visit          Mental Health    Attention deficit hyperactivity disorder (ADHD), combined type (Chronic)    Relevant Medications    Amphetamine ER (adZENys XR-ODT) 18.8 MG Tablet Extended Release Dispersible     Other Visit Diagnoses       Encounter for long-term (current) use of other medications    -  Primary          Diagnoses         Codes Comments    Encounter for long-term (current) use of other medications    -  Primary ICD-10-CM: Z79.899  ICD-9-CM: V58.69     Attention deficit hyperactivity disorder (ADHD), combined type     ICD-10-CM: F90.2  ICD-9-CM: 314.01             Visit Diagnoses:    ICD-10-CM ICD-9-CM   1. Encounter for long-term (current) use of other medications  Z79.899 V58.69   2. Attention deficit hyperactivity disorder (ADHD), combined type  F90.2 314.01         TREATMENT PLAN/GOALS: Continue supportive psychotherapy efforts and medications as indicated. Treatment and medication options discussed during today's visit. Patient ackowledged and verbally consented to continue with current treatment plan and was educated on the importance of compliance with treatment and follow-up appointments.    MEDICATION ISSUES:  1. ADHD inattentive - Cont Adzenys 18, stable on meds, no side effects , weight stable           UDS 10/27/2020  Consistent, 3/25/2022 consistent     Will repeat today     PHQ scored 5  and indicated mild depression   JOSE 7 scored 6      INSPECT reviewed as expected adzenys   8/9/23      Patient screened positive for depression based on a PHQ-9 score of 5 on 8/17/2023. Follow-up recommendations include:  not depressed  .        Discussed medication options and treatment plan of prescribed medication as well as the risks, benefits, and side effects including potential falls, possible impaired driving and metabolic adversities among others. Patient is agreeable to call the office with any worsening of symptoms or onset  of side effects. Patient is agreeable to call 911 or go to the nearest ER should he/she begin having SI/HI. No medication side effects or related complaints today.     MEDS ORDERED DURING VISIT:  New Medications Ordered This Visit   Medications    Amphetamine ER (adZENys XR-ODT) 18.8 MG Tablet Extended Release Dispersible     Sig: Place 1 each on the tongue 2 (Two) Times a Day. At 8 AM and 3 PM     Dispense:  60 each     Refill:  0     Please dispense when it is due        Return in about 4 months (around 12/17/2023).       This document has been electronically signed by Hafsa Oneal MD  August 17, 2023 14:10 EDT

## 2023-08-29 ENCOUNTER — OFFICE VISIT (OUTPATIENT)
Dept: FAMILY MEDICINE CLINIC | Facility: CLINIC | Age: 33
End: 2023-08-29
Payer: COMMERCIAL

## 2023-08-29 ENCOUNTER — LAB (OUTPATIENT)
Dept: FAMILY MEDICINE CLINIC | Facility: CLINIC | Age: 33
End: 2023-08-29
Payer: COMMERCIAL

## 2023-08-29 VITALS
OXYGEN SATURATION: 99 % | SYSTOLIC BLOOD PRESSURE: 116 MMHG | HEART RATE: 70 BPM | WEIGHT: 168.4 LBS | BODY MASS INDEX: 19.48 KG/M2 | DIASTOLIC BLOOD PRESSURE: 74 MMHG | RESPIRATION RATE: 16 BRPM | HEIGHT: 78 IN

## 2023-08-29 DIAGNOSIS — M54.42 CHRONIC MIDLINE LOW BACK PAIN WITH LEFT-SIDED SCIATICA: ICD-10-CM

## 2023-08-29 DIAGNOSIS — N43.2 OTHER HYDROCELE: ICD-10-CM

## 2023-08-29 DIAGNOSIS — G89.29 CHRONIC MIDLINE LOW BACK PAIN WITH LEFT-SIDED SCIATICA: ICD-10-CM

## 2023-08-29 DIAGNOSIS — L20.9 ATOPIC DERMATITIS IN ADULT: ICD-10-CM

## 2023-08-29 DIAGNOSIS — Z00.00 ANNUAL PHYSICAL EXAM: Primary | ICD-10-CM

## 2023-08-29 DIAGNOSIS — K11.1 PAROTID GLAND ENLARGEMENT: ICD-10-CM

## 2023-08-29 PROCEDURE — 80053 COMPREHEN METABOLIC PANEL: CPT | Performed by: STUDENT IN AN ORGANIZED HEALTH CARE EDUCATION/TRAINING PROGRAM

## 2023-08-29 PROCEDURE — 86803 HEPATITIS C AB TEST: CPT | Performed by: STUDENT IN AN ORGANIZED HEALTH CARE EDUCATION/TRAINING PROGRAM

## 2023-08-29 PROCEDURE — 82550 ASSAY OF CK (CPK): CPT | Performed by: STUDENT IN AN ORGANIZED HEALTH CARE EDUCATION/TRAINING PROGRAM

## 2023-08-29 PROCEDURE — 85025 COMPLETE CBC W/AUTO DIFF WBC: CPT | Performed by: STUDENT IN AN ORGANIZED HEALTH CARE EDUCATION/TRAINING PROGRAM

## 2023-08-29 PROCEDURE — 86038 ANTINUCLEAR ANTIBODIES: CPT | Performed by: STUDENT IN AN ORGANIZED HEALTH CARE EDUCATION/TRAINING PROGRAM

## 2023-08-29 PROCEDURE — 80061 LIPID PANEL: CPT | Performed by: STUDENT IN AN ORGANIZED HEALTH CARE EDUCATION/TRAINING PROGRAM

## 2023-08-29 PROCEDURE — 36415 COLL VENOUS BLD VENIPUNCTURE: CPT | Performed by: STUDENT IN AN ORGANIZED HEALTH CARE EDUCATION/TRAINING PROGRAM

## 2023-08-29 PROCEDURE — 81003 URINALYSIS AUTO W/O SCOPE: CPT | Performed by: STUDENT IN AN ORGANIZED HEALTH CARE EDUCATION/TRAINING PROGRAM

## 2023-08-29 NOTE — PROGRESS NOTES
Chief Complaint  Establish Care    Subjective        Kan Saavedra presents to [facility]  History of Present Illness    Kan is a 33-year-old male with a past medical history of ADHD who he follows with psychiatry for.  Presents today to establish care with me.  He does have some minor concerns.  The first biggest concern is his back pain.  He said he was lifting weights 3 or 4 years ago.  He notes acute onset of pain and felt it gradually get better.  He does state that he is really stiff in the morning.  And gets better throughout the day.  He says if he lays down the pain is worse.  He said it sometimes runs down his left leg.  He has some physical therapy friends who have done different exercises for him.  He has tried ibuprofen and Tylenol without much relief.  He said that he feels better when he sits straight up.  He has to watch when he bends over.  He said that his mom had something similar when she was younger.    Is also noticed some dry skin and discoloration of his lower extremities.  He has not really tried much for this.  Is concerned about swelling in his legs.  Has tried different socks to see if that would help.  Said it gets worse when he wears long socks.    He also is hoping to get lab work because he has not had seen a doctor in a while.  Is concerned due to his family history.  Has had different people with coronary artery disease in the past.    He is also has a history of hydrocele.  He said he was diagnosed with this via an ultrasound done in the past.  He said it is enlarging.  Said it is painful to sit sometimes a lot and also have sexual activity.  He said it interferes with what kind closings wears any kind of shorts or not tight pants will reveal area.  He does want to have future kids.  Is worried about infertility.  He said it is interfering with his life.  Is interested in seeing what urology and getting a repeat ultrasound as a show.  Is  "worried about insurance coverage as he has been told in the past that it would not be covered.    Objective   Vital Signs:  /74 (BP Location: Left arm, Patient Position: Sitting, Cuff Size: Adult)   Pulse 70   Resp 16   Ht 198.1 cm (78\")   Wt 76.4 kg (168 lb 6.4 oz)   SpO2 99%   BMI 19.46 kg/mý   Estimated body mass index is 19.46 kg/mý as calculated from the following:    Height as of this encounter: 198.1 cm (78\").    Weight as of this encounter: 76.4 kg (168 lb 6.4 oz).             Physical Exam  Constitutional:       General: He is not in acute distress.     Appearance: Normal appearance. He is normal weight.   HENT:      Head: Normocephalic.      Comments: Parotid gland enlargement noted on bilateral face right greater than left     Right Ear: Tympanic membrane and ear canal normal.      Left Ear: Tympanic membrane and ear canal normal.      Nose: Nose normal. No congestion.      Mouth/Throat:      Mouth: Mucous membranes are moist.      Pharynx: No oropharyngeal exudate.   Eyes:      Extraocular Movements: Extraocular movements intact.      Conjunctiva/sclera: Conjunctivae normal.      Pupils: Pupils are equal, round, and reactive to light.   Cardiovascular:      Rate and Rhythm: Normal rate and regular rhythm.      Pulses: Normal pulses.      Heart sounds: No murmur heard.  Pulmonary:      Effort: Pulmonary effort is normal. No respiratory distress.      Breath sounds: Normal breath sounds. No wheezing.   Abdominal:      General: Abdomen is flat. Bowel sounds are normal. There is no distension.      Palpations: Abdomen is soft.      Tenderness: There is no abdominal tenderness.   Genitourinary:     Comments: Enlargement of scrotum noted through pants  Musculoskeletal:         General: Normal range of motion.      Cervical back: Normal range of motion and neck supple. No rigidity.      Comments: Upright positioning noted, slow movement to  things from the ground.  Tenderness to palpation of " the paraspinal process   Lymphadenopathy:      Cervical: No cervical adenopathy.   Skin:     General: Skin is warm.      Capillary Refill: Capillary refill takes less than 2 seconds.      Comments: Dry flaky skin noted throughout the lower extremities and upper extremities.  Cracked skin noted on hands   Neurological:      General: No focal deficit present.      Mental Status: He is alert and oriented to person, place, and time.      Cranial Nerves: No cranial nerve deficit.      Result Review :                   Assessment and Plan   Diagnoses and all orders for this visit:    1. Annual physical exam (Primary)  -     XR Sacroiliac Joints 1 or 2 View; Future  -     CBC w AUTO Differential  -     Hepatitis C antibody  -     Lipid panel  -     Comprehensive metabolic panel  -     XR Spine Lumbar Complete With Flex & Ext; Future  -     CK  -     Urinalysis With Culture If Indicated - Urine, Clean Catch  -     PARVIZ Direct Reflex to 11 Biomarker    2. Other hydrocele  -     Ambulatory Referral to Urology  -     US Scrotum & Testicles; Future    3. Chronic midline low back pain with left-sided sciatica    4. Parotid gland enlargement    5. Atopic dermatitis in adult    Hydrocele-we will repeat ultrasound now.  We will also refer to urology.  Urine analysis looks perfect.  Hopefully we can get repair done of the area.  He does already use tight fitting boxer briefs to assist with elevation.  No discrete pain.    Kan has an interesting presentation to my office today.  He has upright persistent numbness back and slow progression of the spine to go  things.  He seems tight.  The story of him with the pain getting better throughout the day and worse in the morning, makes me think of ankylosing spondylitis.  Especially with a history of his family having something similar in the past.  We will get sacroiliac x-ray and lumbar spine x-ray just to ensure that that is not the case.  Encouraged physical therapy and  different exercises to help with that.    It was also noted on exam that he had parotid gland enlargement with also intermittent dermatitis.  Further questioning showed that he has occasionally gets what I think for him may be Raynaud's phenomenon.  Putting this altogether makes me think that he could have scleroderma.  We will get CK along with PARVIZ reflex to see if there is any concerns for that.  He does say that he gets occasional dry mouth and more dry eyes.    Annual-he is due for general lab work-up which we will obtain now.         Follow Up   Return in about 1 year (around 8/29/2024) for Annual physical.  Patient was given instructions and counseling regarding his condition or for health maintenance advice. Please see specific information pulled into the AVS if appropriate.

## 2023-08-30 ENCOUNTER — TELEPHONE (OUTPATIENT)
Dept: FAMILY MEDICINE CLINIC | Facility: CLINIC | Age: 33
End: 2023-08-30
Payer: COMMERCIAL

## 2023-08-30 PROBLEM — N43.2 OTHER HYDROCELE: Status: ACTIVE | Noted: 2023-08-30

## 2023-08-30 PROBLEM — K11.1 PAROTID GLAND ENLARGEMENT: Status: ACTIVE | Noted: 2023-08-30

## 2023-08-30 PROBLEM — L20.9 ATOPIC DERMATITIS IN ADULT: Status: ACTIVE | Noted: 2023-08-30

## 2023-08-30 LAB
ALBUMIN SERPL-MCNC: 4.3 G/DL (ref 3.5–5.2)
ALBUMIN/GLOB SERPL: 2 G/DL
ALP SERPL-CCNC: 38 U/L (ref 39–117)
ALT SERPL W P-5'-P-CCNC: 18 U/L (ref 1–41)
ANA SER QL: NEGATIVE
ANION GAP SERPL CALCULATED.3IONS-SCNC: 10 MMOL/L (ref 5–15)
AST SERPL-CCNC: 26 U/L (ref 1–40)
BASOPHILS # BLD AUTO: 0.05 10*3/MM3 (ref 0–0.2)
BASOPHILS NFR BLD AUTO: 1.4 % (ref 0–1.5)
BILIRUB SERPL-MCNC: 0.3 MG/DL (ref 0–1.2)
BILIRUB UR QL STRIP: NEGATIVE
BUN SERPL-MCNC: 12 MG/DL (ref 6–20)
BUN/CREAT SERPL: 14.1 (ref 7–25)
CALCIUM SPEC-SCNC: 9.2 MG/DL (ref 8.6–10.5)
CHLORIDE SERPL-SCNC: 107 MMOL/L (ref 98–107)
CHOLEST SERPL-MCNC: 163 MG/DL (ref 0–200)
CK SERPL-CCNC: 185 U/L (ref 20–200)
CLARITY UR: CLEAR
CO2 SERPL-SCNC: 28 MMOL/L (ref 22–29)
COLOR UR: YELLOW
CREAT SERPL-MCNC: 0.85 MG/DL (ref 0.76–1.27)
DEPRECATED RDW RBC AUTO: 40.5 FL (ref 37–54)
EGFRCR SERPLBLD CKD-EPI 2021: 116.9 ML/MIN/1.73
EOSINOPHIL # BLD AUTO: 0.48 10*3/MM3 (ref 0–0.4)
EOSINOPHIL NFR BLD AUTO: 13.2 % (ref 0.3–6.2)
ERYTHROCYTE [DISTWIDTH] IN BLOOD BY AUTOMATED COUNT: 12.3 % (ref 12.3–15.4)
GLOBULIN UR ELPH-MCNC: 2.2 GM/DL
GLUCOSE SERPL-MCNC: 84 MG/DL (ref 65–99)
GLUCOSE UR STRIP-MCNC: NEGATIVE MG/DL
HCT VFR BLD AUTO: 42.4 % (ref 37.5–51)
HCV AB SER DONR QL: NORMAL
HDLC SERPL-MCNC: 88 MG/DL (ref 40–60)
HGB BLD-MCNC: 14.6 G/DL (ref 13–17.7)
HGB UR QL STRIP.AUTO: NEGATIVE
IMM GRANULOCYTES # BLD AUTO: 0.01 10*3/MM3 (ref 0–0.05)
IMM GRANULOCYTES NFR BLD AUTO: 0.3 % (ref 0–0.5)
KETONES UR QL STRIP: NEGATIVE
LDLC SERPL CALC-MCNC: 64 MG/DL (ref 0–100)
LDLC/HDLC SERPL: 0.74 {RATIO}
LEUKOCYTE ESTERASE UR QL STRIP.AUTO: NEGATIVE
LYMPHOCYTES # BLD AUTO: 1.53 10*3/MM3 (ref 0.7–3.1)
LYMPHOCYTES NFR BLD AUTO: 42.1 % (ref 19.6–45.3)
MCH RBC QN AUTO: 31.3 PG (ref 26.6–33)
MCHC RBC AUTO-ENTMCNC: 34.4 G/DL (ref 31.5–35.7)
MCV RBC AUTO: 90.8 FL (ref 79–97)
MONOCYTES # BLD AUTO: 0.31 10*3/MM3 (ref 0.1–0.9)
MONOCYTES NFR BLD AUTO: 8.5 % (ref 5–12)
NEUTROPHILS NFR BLD AUTO: 1.25 10*3/MM3 (ref 1.7–7)
NEUTROPHILS NFR BLD AUTO: 34.5 % (ref 42.7–76)
NITRITE UR QL STRIP: NEGATIVE
NRBC BLD AUTO-RTO: 0 /100 WBC (ref 0–0.2)
PH UR STRIP.AUTO: 6.5 [PH] (ref 5–8)
PLATELET # BLD AUTO: 228 10*3/MM3 (ref 140–450)
PMV BLD AUTO: 10 FL (ref 6–12)
POTASSIUM SERPL-SCNC: 4.4 MMOL/L (ref 3.5–5.2)
PROT SERPL-MCNC: 6.5 G/DL (ref 6–8.5)
PROT UR QL STRIP: NEGATIVE
RBC # BLD AUTO: 4.67 10*6/MM3 (ref 4.14–5.8)
SODIUM SERPL-SCNC: 145 MMOL/L (ref 136–145)
SP GR UR STRIP: 1.01 (ref 1–1.03)
TRIGL SERPL-MCNC: 50 MG/DL (ref 0–150)
UROBILINOGEN UR QL STRIP: NORMAL
VLDLC SERPL-MCNC: 11 MG/DL (ref 5–40)
WBC NRBC COR # BLD: 3.63 10*3/MM3 (ref 3.4–10.8)

## 2023-08-30 NOTE — TELEPHONE ENCOUNTER
HUB TO READ    Please let patient know that most of his lab work came back already.  His cholesterol looked amazing.  His healthy cholesterol slightly Roussell continue doing what you are doing with your diet.  Still waiting on the PARVIZ to see if there is a rheumatologic process going on.  Make sure to go get the x-rays and ultrasound when he has a chance.

## 2023-08-30 NOTE — TELEPHONE ENCOUNTER
----- Message from Merritt Aguilar MD sent at 8/30/2023  1:32 PM EDT -----  PARVIZ was negative so unlikely the diagnosis of scleroderma. Make sure to get your xrays and US to evaluate for the other processes we talked about

## 2023-08-30 NOTE — TELEPHONE ENCOUNTER
HUB TO READ    PARVIZ was negative so unlikely the diagnosis of scleroderma. Make sure to get your xrays and US to evaluate for the other processes we talked about     SENT MY CHART MESSAGE

## 2023-08-30 NOTE — TELEPHONE ENCOUNTER
----- Message from Merritt Aguilar MD sent at 8/30/2023  7:42 AM EDT -----  Please let patient know that most of his lab work came back already.  His cholesterol looked amazing.  His healthy cholesterol slightly Roussell continue doing what you are doing with your diet.  Still waiting on the PARVIZ to see if there is a rheumatologic process going on.  Make sure to go get the x-rays and ultrasound when he has a chance.

## 2023-10-09 DIAGNOSIS — F90.2 ATTENTION DEFICIT HYPERACTIVITY DISORDER (ADHD), COMBINED TYPE: Chronic | ICD-10-CM

## 2023-10-09 NOTE — TELEPHONE ENCOUNTER
Rx Refill Note  Requested Prescriptions     Pending Prescriptions Disp Refills    Amphetamine ER (adZENys XR-ODT) 18.8 MG Tablet Extended Release Dispersible 60 each 0     Sig: Place 1 each on the tongue 2 (Two) Times a Day. At 8 AM and 3 PM      Last office visit with prescribing clinician: 8/17/2023   Last telemedicine visit with prescribing clinician: Visit date not found   Next office visit with prescribing clinician: 12/19/2023   Office Visit with Hafsa Oneal MD (08/17/2023)   [facility] Urine Drug Screen - (08/17/2023)                     Would you like a call back once the refill request has been completed: [] Yes [] No    If the office needs to give you a call back, can they leave a voicemail: [] Yes [] No    Mimi Ramsey MA  10/09/23, 14:22 EDT    Last fill 9-14-23; INSPECT UPLOADED

## 2023-11-13 DIAGNOSIS — F90.2 ATTENTION DEFICIT HYPERACTIVITY DISORDER (ADHD), COMBINED TYPE: Chronic | ICD-10-CM

## 2023-11-13 NOTE — TELEPHONE ENCOUNTER
Rx Refill Note  Requested Prescriptions     Pending Prescriptions Disp Refills    Amphetamine ER (adZENys XR-ODT) 18.8 MG Tablet Extended Release Dispersible 60 each 0     Sig: Place 1 each on the tongue 2 (Two) Times a Day. At 8 AM and 3 PM      Last office visit with prescribing clinician: 8/17/2023   Last telemedicine visit with prescribing clinician: Visit date not found   Next office visit with prescribing clinician: 12/21/2023   Office Visit with Hafsa Oneal MD (08/17/2023)   [facility] Urine Drug Screen - (08/17/2023)                     Would you like a call back once the refill request has been completed: [] Yes [] No    If the office needs to give you a call back, can they leave a voicemail: [] Yes [] No    Mimi Ramsey MA  11/13/23, 14:44 EST      LAST FILL 10-16-23; UPLOADED

## 2023-12-11 DIAGNOSIS — F90.2 ATTENTION DEFICIT HYPERACTIVITY DISORDER (ADHD), COMBINED TYPE: Chronic | ICD-10-CM

## 2023-12-11 NOTE — TELEPHONE ENCOUNTER
Rx Refill Note  Requested Prescriptions     Pending Prescriptions Disp Refills    Amphetamine ER (adZENys XR-ODT) 18.8 MG Tablet Extended Release Dispersible 60 each 0     Sig: Place 1 each on the tongue 2 (Two) Times a Day. At 8 AM and 3 PM      Last office visit with prescribing clinician: 8/17/2023   Last telemedicine visit with prescribing clinician: Visit date not found   Next office visit with prescribing clinician: 12/21/2023   Office Visit with Hafsa Oneal MD (08/17/2023)   [facility] Urine Drug Screen - (08/17/2023)                     Would you like a call back once the refill request has been completed: [] Yes [] No    If the office needs to give you a call back, can they leave a voicemail: [] Yes [] No    Mimi Ramsey MA  12/11/23, 16:12 EST    Last fill 11-14-23; UPLOADED

## 2024-01-11 ENCOUNTER — TELEPHONE (OUTPATIENT)
Dept: PSYCHIATRY | Facility: CLINIC | Age: 34
End: 2024-01-11
Payer: COMMERCIAL

## 2024-01-11 NOTE — TELEPHONE ENCOUNTER
Pt says he missed his appt with you because he currently doesn't have insurance.  He says he is not sure he can afford Adzenys, and is wondering if you could prescribe something more affordable for the moment?  Do you want me to ask him to see who has it in stock?  LAST FILL 12-15-23; UPLOADED

## 2024-01-12 NOTE — TELEPHONE ENCOUNTER
Pt says he would like to go back on Adderall XR 30 mg BID, and he found it in stock at [facility].

## 2024-02-12 ENCOUNTER — TELEPHONE (OUTPATIENT)
Dept: PSYCHIATRY | Facility: CLINIC | Age: 34
End: 2024-02-12
Payer: COMMERCIAL

## 2024-02-12 DIAGNOSIS — Z79.899 ENCOUNTER FOR LONG-TERM (CURRENT) USE OF OTHER MEDICATIONS: Primary | ICD-10-CM

## 2024-02-12 DIAGNOSIS — F90.2 ATTENTION DEFICIT HYPERACTIVITY DISORDER (ADHD), COMBINED TYPE: ICD-10-CM

## 2024-02-12 NOTE — TELEPHONE ENCOUNTER
Pt says he has insurance now and he would like to go back on Adzenys 18.8 mg and have it sent to [facility]; last fill 1-16-23; UPLOADED

## 2024-02-13 RX ORDER — AMPHETAMINE 18.8 MG/1
1 TABLET, ORALLY DISINTEGRATING ORAL 2 TIMES DAILY
Qty: 60 EACH | Refills: 0 | Status: SHIPPED | OUTPATIENT
Start: 2024-02-13

## 2024-03-11 DIAGNOSIS — F90.2 ATTENTION DEFICIT HYPERACTIVITY DISORDER (ADHD), COMBINED TYPE: ICD-10-CM

## 2024-03-11 NOTE — TELEPHONE ENCOUNTER
Rx Refill Note  Requested Prescriptions     Pending Prescriptions Disp Refills    Amphetamine ER (adZENys XR-ODT) 18.8 MG Tablet Extended Release Dispersible 60 each 0     Sig: Place 1 each on the tongue 2 (Two) Times a Day.      Last office visit with prescribing clinician: 8/17/2023   Last telemedicine visit with prescribing clinician: Visit date not found   Next office visit with prescribing clinician: Visit date not found   Office Visit with Hafsa Oneal MD (08/17/2023)   [facility] Urine Drug Screen - (08/17/2023)                     Would you like a call back once the refill request has been completed: [] Yes [] No    If the office needs to give you a call back, can they leave a voicemail: [] Yes [] No    Mimi Ramsey MA  03/11/24, 16:00 EDT    Last fill 2-15-24; UPLOADED; PHONES ROLLED FORWARD AT THE END OF DAY, SO PT WILL CALL TOMORROW TO MAKE AN APPT

## 2024-03-13 DIAGNOSIS — F90.2 ATTENTION DEFICIT HYPERACTIVITY DISORDER (ADHD), COMBINED TYPE: ICD-10-CM

## 2024-03-13 RX ORDER — AMPHETAMINE 18.8 MG/1
1 TABLET, ORALLY DISINTEGRATING ORAL 2 TIMES DAILY
Qty: 60 EACH | Refills: 0 | OUTPATIENT
Start: 2024-03-13

## 2024-03-13 NOTE — TELEPHONE ENCOUNTER
Rx Refill Note  Requested Prescriptions     Pending Prescriptions Disp Refills    Amphetamine ER (adZENys XR-ODT) 18.8 MG Tablet Extended Release Dispersible 60 each 0     Sig: Place 1 each on the tongue 2 (Two) Times a Day.      Last office visit with prescribing clinician: 8/17/2023   Last telemedicine visit with prescribing clinician: Visit date not found   Next office visit with prescribing clinician: 6/27/2024                         Would you like a call back once the refill request has been completed: [] Yes [] No    If the office needs to give you a call back, can they leave a voicemail: [] Yes [] No    Mimi Ramsey MA  03/13/24, 13:46 EDT    Pt made an appt now

## 2024-03-14 RX ORDER — AMPHETAMINE 18.8 MG/1
1 TABLET, ORALLY DISINTEGRATING ORAL 2 TIMES DAILY
Qty: 60 EACH | Refills: 0 | Status: SHIPPED | OUTPATIENT
Start: 2024-03-14

## 2024-04-12 DIAGNOSIS — F90.2 ATTENTION DEFICIT HYPERACTIVITY DISORDER (ADHD), COMBINED TYPE: ICD-10-CM

## 2024-04-12 RX ORDER — AMPHETAMINE 18.8 MG/1
1 TABLET, ORALLY DISINTEGRATING ORAL 2 TIMES DAILY
Qty: 60 EACH | Refills: 0 | Status: SHIPPED | OUTPATIENT
Start: 2024-04-12

## 2024-04-12 NOTE — TELEPHONE ENCOUNTER
Rx Refill Note  Requested Prescriptions     Pending Prescriptions Disp Refills    Amphetamine ER (adZENys XR-ODT) 18.8 MG Tablet Extended Release Dispersible 60 each 0     Sig: Place 1 each on the tongue 2 (Two) Times a Day.      Last office visit with prescribing clinician: 8/17/2023   Last telemedicine visit with prescribing clinician: Visit date not found   Next office visit with prescribing clinician: 6/27/2024   Office Visit with Hafsa Oneal MD (08/17/2023)   [facility] Urine Drug Screen - (08/17/2023)                     Would you like a call back once the refill request has been completed: [] Yes [] No    If the office needs to give you a call back, can they leave a voicemail: [] Yes [] No    Mimi Ramsey MA  04/12/24, 09:27 EDT    Last fill OF PATCH 3-15-24; UPLOADED

## 2024-05-09 DIAGNOSIS — F90.2 ATTENTION DEFICIT HYPERACTIVITY DISORDER (ADHD), COMBINED TYPE: ICD-10-CM

## 2024-05-09 NOTE — TELEPHONE ENCOUNTER
Rx Refill Note  Requested Prescriptions     Pending Prescriptions Disp Refills    Amphetamine ER (adZENys XR-ODT) 18.8 MG Tablet Extended Release Dispersible 60 each 0     Sig: Place 1 each on the tongue 2 (Two) Times a Day.      Last office visit with prescribing clinician: 8/17/2023   Last telemedicine visit with prescribing clinician: Visit date not found   Next office visit with prescribing clinician: 6/27/2024   Office Visit with Hafsa Oneal MD (08/17/2023)   [facility] Urine Drug Screen - (08/17/2023)                     Would you like a call back once the refill request has been completed: [] Yes [] No    If the office needs to give you a call back, can they leave a voicemail: [] Yes [] No    Mimi Ramsey MA  05/09/24, 16:16 EDT    LAST PATCH FILL 4-13-24; PT SAYS THE PATCH GOES TO [facility] [place]; UPLOADED

## 2024-05-10 RX ORDER — AMPHETAMINE 18.8 MG/1
1 TABLET, ORALLY DISINTEGRATING ORAL 2 TIMES DAILY
Qty: 60 EACH | Refills: 0 | Status: SHIPPED | OUTPATIENT
Start: 2024-05-10

## 2024-05-28 ENCOUNTER — TELEPHONE (OUTPATIENT)
Dept: PSYCHIATRY | Facility: CLINIC | Age: 34
End: 2024-05-28
Payer: COMMERCIAL

## 2024-06-10 DIAGNOSIS — F90.2 ATTENTION DEFICIT HYPERACTIVITY DISORDER (ADHD), COMBINED TYPE: ICD-10-CM

## 2024-06-10 NOTE — TELEPHONE ENCOUNTER
Rx Refill Note  Requested Prescriptions     Pending Prescriptions Disp Refills    Amphetamine ER (adZENys XR-ODT) 18.8 MG Tablet Extended Release Dispersible 60 each 0     Sig: Place 1 each on the tongue 2 (Two) Times a Day.        Last office visit with prescribing clinician: 8/17/2023     Next office visit with prescribing clinician: 6/27/2024     Office Visit with Hafsa Oneal MD (08/17/2023)     [facility] Urine Drug Screen - (08/17/2023)     Telephone with Hafsa Oneal MD (05/28/2024)     Inspect Fill 5/12/24    Ankita Jean Baptiste  06/10/24, 13:57 EDT

## 2024-06-11 RX ORDER — AMPHETAMINE 18.8 MG/1
1 TABLET, ORALLY DISINTEGRATING ORAL 2 TIMES DAILY
Qty: 60 EACH | Refills: 0 | OUTPATIENT
Start: 2024-06-11

## 2024-06-11 NOTE — TELEPHONE ENCOUNTER
The pt never responded     Called pt for random UDS 5-13-24 @1:28pm and 5-20-24 @10:01am and LVM and sent mychart; no returned phone calls.

## 2024-06-13 ENCOUNTER — TELEPHONE (OUTPATIENT)
Dept: PSYCHIATRY | Facility: CLINIC | Age: 34
End: 2024-06-13
Payer: COMMERCIAL

## 2024-06-13 NOTE — TELEPHONE ENCOUNTER
Pt was wondering why his refill was denied; I informed him it was due to trying to contact him for a random UDS and no call backs.  Pt says he works all day tomorrow, but could he do it Monday?

## 2024-06-20 ENCOUNTER — TELEPHONE (OUTPATIENT)
Dept: PSYCHIATRY | Facility: CLINIC | Age: 34
End: 2024-06-20
Payer: COMMERCIAL

## 2024-06-20 NOTE — TELEPHONE ENCOUNTER
Called pt for random UDS 6-14-24 @ 1:25pm, but msg said subscriber was not taking calls at that time.    Called again on 6-17-24 @ 10:26am and LVM to call us back.  I also sent a [facility] msg.    No returned phone calls.

## 2024-06-27 ENCOUNTER — OFFICE VISIT (OUTPATIENT)
Dept: PSYCHIATRY | Facility: CLINIC | Age: 34
End: 2024-06-27
Payer: COMMERCIAL

## 2024-06-27 DIAGNOSIS — Z79.899 ENCOUNTER FOR LONG-TERM (CURRENT) USE OF OTHER MEDICATIONS: ICD-10-CM

## 2024-06-27 DIAGNOSIS — F90.2 ATTENTION DEFICIT HYPERACTIVITY DISORDER (ADHD), COMBINED TYPE: Primary | ICD-10-CM

## 2024-06-27 RX ORDER — AMPHETAMINE 18.8 MG/1
1 TABLET, ORALLY DISINTEGRATING ORAL 2 TIMES DAILY
Qty: 60 EACH | Refills: 0 | Status: SHIPPED | OUTPATIENT
Start: 2024-06-27

## 2024-06-27 NOTE — PROGRESS NOTES
"Subjective   Kan Saavedra is a 34 y.o. male who presents today for follow up       Chief Complaint:   decreased concentration, mood , family losses     History of Present Illness:   The patient was diagnosed with ADHD when he was a child, he was stable on medications,  When on medications he is able to stay focused to finish the project   mood is stable, denied feeling hopeless/helpless, denied AVH  No anxiety related to meds    Today the pt reported numerous unhappy events last year   Last visit was on 23   The pt had to travel for  and he was working many hours per week  Overall, mood is stable,  Decreased concentration without meds   Depression is rated as 2-3/10, denied feeling hopeless/helpless   he is taking care of his family,   works full time    Adzenys is still  effective and less \"ups and downs\"     when on meds - concentration is better , more organized, more productive ,   Sleep - fragmented  but overall sufficient   The pt denied al drug use     The following portions of the patient's history were reviewed and updated as appropriate: allergies, current medications, past family history, past medical history, past social history, past surgical history and problem list.  PAST PSYCHIATRIC HISTORY      Previous Psychiatric Diagnoses   Axis I: Attention Deficit Disorder     Past Hospitalizations or Residential Treatment   Locations\Providers: none      Past Outpatient Treatment   Diagnosis Treated: Affective Disorder, Anxiety/Panic Dis.  Treatment Type: Medication Management  Location: in school - psych      Prior Psychiatric Medications   Comments: ritalin since 2 grade, then taken off due to side effects      concerta - no difference      adderall - was rxd but did nto take it     viibryd - made numb      vyvanse was effective      Consequences of Mental Disorder   Consequences: family disruption, emotional distress     SOCIAL HISTORY   Single  Number of marriages: 0  Number of children: " 1  Current Relationship is: unstable  Family of Origin is: supportive     Current Living Situation   Lives with: children, significant other     Education   Level: some college     Employment   Job Status: full-time     Hobbies and Leisure Activities   Activity Type: quiet activities     Alcohol use   Freq. drinking: <1  Smoking History   Smoking Hx: Current every day smoker  Pack per day: 1     Exercise   Exercise sessions (per wk): 2     Illicit Drug Use   Illicit Drugs used: none     FAMILY HISTORY OF MENTAL DISORDERS   fh Grandparents: Non-contributory  fh Mother: Affective Disorders  fh Father: Non-contributory  fh Siblings: Non-contributory  fh Other: Non-contributory             Interval History  No Change, stable     Side Effects  Denied       Past Medical History:  Past Medical History:   Diagnosis Date    ADHD (attention deficit hyperactivity disorder)        Past Surgical History:  No past surgical history on file.    Problem List:  Patient Active Problem List   Diagnosis    Attention deficit hyperactivity disorder (ADHD), combined type    Atopic dermatitis in adult    Parotid gland enlargement    Other hydrocele       Allergy:   No Known Allergies     Discontinued Medications:  Medications Discontinued During This Encounter   Medication Reason    Amphetamine ER (adZENys XR-ODT) 18.8 MG Tablet Extended Release Dispersible Reorder           Current Medications:   Current Outpatient Medications   Medication Sig Dispense Refill    Amphetamine ER (adZENys XR-ODT) 18.8 MG Tablet Extended Release Dispersible Place 1 each on the tongue 2 (Two) Times a Day. 60 each 0     No current facility-administered medications for this visit.         Review of Symptoms:    Psychiatric/Behavioral: Negative for agitation, behavioral problems, confusion, decreased concentration, dysphoric mood, hallucinations, self-injury, sleep disturbance and suicidal ideas.   The patient is not depressed, not nervous/anxious and is not  hyperactive.        Physical Exam:   There were no vitals taken for this visit.    Mental Status Exam:   Hygiene:   good  Cooperation:  Cooperative  Eye Contact:  Good  Psychomotor Behavior:  Appropriate  Affect:  Appropriate and Blunted   Mood: euthymic  Hopelessness: Denies  Speech:  Normal  Thought Process:  Goal directed and Linear  Thought Content:  Normal  Suicidal:  None  Homicidal:  None  Hallucinations:  None  Delusion:  None  Memory:  Intact  Orientation:  Person, Place, Time and Situation  Reliability:  good  Insight:  Good  Judgement:  Good  Impulse Control:  Fair  Physical/Medical Issues:  No      MSE from  8/17/23    reviewed and accepted with  changes     PHQ-9 Depression Screening  Little interest or pleasure in doing things? 2-->more than half the days   Feeling down, depressed, or hopeless? 1-->several days   Trouble falling or staying asleep, or sleeping too much? 1-->several days   Feeling tired or having little energy? 2-->more than half the days   Poor appetite or overeating? 1-->several days   Feeling bad about yourself - or that you are a failure or have let yourself or your family down? 2-->more than half the days   Trouble concentrating on things, such as reading the newspaper or watching television? 2-->more than half the days   Moving or speaking so slowly that other people could have noticed? Or the opposite - being so fidgety or restless that you have been moving around a lot more than usual? 0-->not at all   Thoughts that you would be better off dead, or of hurting yourself in some way? 0-->not at all   PHQ-9 Total Score 11   If you checked off any problems, how difficult have these problems made it for you to do your work, take care of things at home, or get along with other people? very difficult           Former smoker    I advised Kan of the risks of tobacco use.     Lab Results:   No visits with results within 3 Month(s) from this visit.   Latest known visit with results is:    Office Visit on 08/29/2023   Component Date Value Ref Range Status    Hepatitis C Ab 08/29/2023 Non-Reactive  Non-Reactive Final    Total Cholesterol 08/29/2023 163  0 - 200 mg/dL Final    Triglycerides 08/29/2023 50  0 - 150 mg/dL Final    HDL Cholesterol 08/29/2023 88 (H)  40 - 60 mg/dL Final    LDL Cholesterol  08/29/2023 64  0 - 100 mg/dL Final    VLDL Cholesterol 08/29/2023 11  5 - 40 mg/dL Final    LDL/HDL Ratio 08/29/2023 0.74   Final    Glucose 08/29/2023 84  65 - 99 mg/dL Final    BUN 08/29/2023 12  6 - 20 mg/dL Final    Creatinine 08/29/2023 0.85  0.76 - 1.27 mg/dL Final    Sodium 08/29/2023 145  136 - 145 mmol/L Final    Potassium 08/29/2023 4.4  3.5 - 5.2 mmol/L Final    Chloride 08/29/2023 107  98 - 107 mmol/L Final    CO2 08/29/2023 28.0  22.0 - 29.0 mmol/L Final    Calcium 08/29/2023 9.2  8.6 - 10.5 mg/dL Final    Total Protein 08/29/2023 6.5  6.0 - 8.5 g/dL Final    Albumin 08/29/2023 4.3  3.5 - 5.2 g/dL Final    ALT (SGPT) 08/29/2023 18  1 - 41 U/L Final    AST (SGOT) 08/29/2023 26  1 - 40 U/L Final    Alkaline Phosphatase 08/29/2023 38 (L)  39 - 117 U/L Final    Total Bilirubin 08/29/2023 0.3  0.0 - 1.2 mg/dL Final    Globulin 08/29/2023 2.2  gm/dL Final    A/G Ratio 08/29/2023 2.0  g/dL Final    BUN/Creatinine Ratio 08/29/2023 14.1  7.0 - 25.0 Final    Anion Gap 08/29/2023 10.0  5.0 - 15.0 mmol/L Final    eGFR 08/29/2023 116.9  >60.0 mL/min/1.73 Final    Creatine Kinase 08/29/2023 185  20 - 200 U/L Final    Color, UA 08/29/2023 Yellow  Yellow, Straw Final    Appearance, UA 08/29/2023 Clear  Clear Final    pH, UA 08/29/2023 6.5  5.0 - 8.0 Final    Specific Gravity, UA 08/29/2023 1.007  1.005 - 1.030 Final    Glucose, UA 08/29/2023 Negative  Negative Final    Ketones, UA 08/29/2023 Negative  Negative Final    Bilirubin, UA 08/29/2023 Negative  Negative Final    Blood, UA 08/29/2023 Negative  Negative Final    Protein, UA 08/29/2023 Negative  Negative Final    Leuk Esterase, UA 08/29/2023 Negative   Negative Final    Nitrite, UA 08/29/2023 Negative  Negative Final    Urobilinogen, UA 08/29/2023 0.2 E.U./dL  0.2 - 1.0 E.U./dL Final    PARVIZ Direct 08/29/2023 Negative  Negative Final    WBC 08/29/2023 3.63  3.40 - 10.80 10*3/mm3 Final    RBC 08/29/2023 4.67  4.14 - 5.80 10*6/mm3 Final    Hemoglobin 08/29/2023 14.6  13.0 - 17.7 g/dL Final    Hematocrit 08/29/2023 42.4  37.5 - 51.0 % Final    MCV 08/29/2023 90.8  79.0 - 97.0 fL Final    MCH 08/29/2023 31.3  26.6 - 33.0 pg Final    MCHC 08/29/2023 34.4  31.5 - 35.7 g/dL Final    RDW 08/29/2023 12.3  12.3 - 15.4 % Final    RDW-SD 08/29/2023 40.5  37.0 - 54.0 fl Final    MPV 08/29/2023 10.0  6.0 - 12.0 fL Final    Platelets 08/29/2023 228  140 - 450 10*3/mm3 Final    Neutrophil % 08/29/2023 34.5 (L)  42.7 - 76.0 % Final    Lymphocyte % 08/29/2023 42.1  19.6 - 45.3 % Final    Monocyte % 08/29/2023 8.5  5.0 - 12.0 % Final    Eosinophil % 08/29/2023 13.2 (H)  0.3 - 6.2 % Final    Basophil % 08/29/2023 1.4  0.0 - 1.5 % Final    Immature Grans % 08/29/2023 0.3  0.0 - 0.5 % Final    Neutrophils, Absolute 08/29/2023 1.25 (L)  1.70 - 7.00 10*3/mm3 Final    Lymphocytes, Absolute 08/29/2023 1.53  0.70 - 3.10 10*3/mm3 Final    Monocytes, Absolute 08/29/2023 0.31  0.10 - 0.90 10*3/mm3 Final    Eosinophils, Absolute 08/29/2023 0.48 (H)  0.00 - 0.40 10*3/mm3 Final    Basophils, Absolute 08/29/2023 0.05  0.00 - 0.20 10*3/mm3 Final    Immature Grans, Absolute 08/29/2023 0.01  0.00 - 0.05 10*3/mm3 Final    nRBC 08/29/2023 0.0  0.0 - 0.2 /100 WBC Final       Assessment & Plan   Problems Addressed this Visit       Attention deficit hyperactivity disorder (ADHD), combined type - Primary (Chronic)    Relevant Medications    Amphetamine ER (adZENys XR-ODT) 18.8 MG Tablet Extended Release Dispersible    Other Relevant Orders    ToxAssure Flex 22, Ur w/DL -     Other Visit Diagnoses       Encounter for long-term (current) use of other medications        Relevant Orders    ToxAssure Flex 22, Ur  w/DL -          Diagnoses         Codes Comments    Attention deficit hyperactivity disorder (ADHD), combined type    -  Primary ICD-10-CM: F90.2  ICD-9-CM: 314.01     Encounter for long-term (current) use of other medications     ICD-10-CM: Z79.899  ICD-9-CM: V58.69             Visit Diagnoses:    ICD-10-CM ICD-9-CM   1. Attention deficit hyperactivity disorder (ADHD), combined type  F90.2 314.01   2. Encounter for long-term (current) use of other medications  Z79.899 V58.69           TREATMENT PLAN/GOALS: Continue supportive psychotherapy efforts and medications as indicated. Treatment and medication options discussed during today's visit. Patient ackowledged and verbally consented to continue with current treatment plan and was educated on the importance of compliance with treatment and follow-up appointments.    MEDICATION ISSUES:  1. ADHD inattentive - Cont Adzenys 18, stable on meds, no side effects , weight stable      The pt was informed about necessity to stay compliant with f/u and drug screens, the pt verbalized understanding      UDS 10/27/2020  Consistent, 3/25/2022 consistent     UDS 8/17/23 - consistent   LABORATORY - SCAN - URINE DRUG SCREEN, [facility], 08/17/2023 (08/17/2023)   Will repeat today, the pt was off meds for few days evidenced by INSPECT     PHQ scored 11  and indicated moderate depression (mainly due to decreased concentration without meds)   JOSE 7 scored 8      INSPECT reviewed as expected adzenys   5/12/24       Patient screened positive for depression based on a PHQ-9 score of 11 on 6/27/2024. Follow-up recommendations include:  supportive therapy, coping skills .        Discussed medication options and treatment plan of prescribed medication as well as the risks, benefits, and side effects including potential falls, possible impaired driving and metabolic adversities among others. Patient is agreeable to call the office with any worsening of symptoms or onset of side effects. Patient  is agreeable to call 911 or go to the nearest ER should he/she begin having SI/HI. No medication side effects or related complaints today.     MEDS ORDERED DURING VISIT:  New Medications Ordered This Visit   Medications    Amphetamine ER (adZENys XR-ODT) 18.8 MG Tablet Extended Release Dispersible     Sig: Place 1 each on the tongue 2 (Two) Times a Day.     Dispense:  60 each     Refill:  0        Return in about 3 months (around 9/27/2024).       This document has been electronically signed by Hafsa Oneal MD  June 27, 2024 11:44 EDT

## 2024-07-03 LAB
1OH-MIDAZOLAM UR QL SCN: NOT DETECTED NG/MG CREAT
6MAM UR QL SCN: NEGATIVE NG/ML
7AMINOCLONAZEPAM/CREAT UR: NOT DETECTED NG/MG CREAT
8OH-AMOXAPINE UR QL: NOT DETECTED
8OH-LOXAPINE UR QL SCN: NOT DETECTED
A-OH ALPRAZ/CREAT UR: NOT DETECTED NG/MG CREAT
A-OH-TRIAZOLAM/CREAT UR CFM: NOT DETECTED NG/MG CREAT
ALPRAZ/CREAT UR CFM: NOT DETECTED NG/MG CREAT
AMITRIP UR-MCNC: NOT DETECTED NG/ML
AMOXAPINE UR QL: NOT DETECTED
AMPHETAMINES UR QL SCN: NEGATIVE NG/ML
ANTICONVULSANTS UR: NEGATIVE
ANTIPSYCHOTICS UR: NEGATIVE
ARIPIPRAZOLE UR QL SCN: NOT DETECTED
ASENAPINE UR QL CFM: NOT DETECTED
BARBITURATES UR QL SCN: NEGATIVE NG/ML
BENZODIAZ SCN METH UR: NEGATIVE
BUPRENORPHINE UR QL SCN: NEGATIVE NG/ML
BUPROPION UR QL: NOT DETECTED
CANNABINOIDS UR QL CFM: NORMAL
CANNABINOIDS UR QL SCN: ABNORMAL NG/ML
CARBOXYTHC UR CFM-MCNC: 1675 NG/MG CREAT
CARISOPRODOL UR QL: NEGATIVE NG/ML
CHLORPROMAZINE UR QL: NOT DETECTED
CITALOPRAM, UR: NOT DETECTED
CLOMIPRAMINE UR-MCNC: NOT DETECTED NG/ML
CLONAZEPAM/CREAT UR CFM: NOT DETECTED NG/MG CREAT
CLOZAPINE UR QL: NOT DETECTED
COCAINE+BZE UR QL SCN: NEGATIVE NG/ML
CREAT UR-MCNC: 8 MG/DL
DESALKYLFLURAZ/CREAT UR: NOT DETECTED NG/MG CREAT
DESIPRAMINE UR-MCNC: NOT DETECTED NG/ML
DIAZEPAM/CREAT UR: NOT DETECTED NG/MG CREAT
DOXEPIN UR-MCNC: NOT DETECTED NG/ML
DULOXETINE UR QL: NOT DETECTED
ETG ETS UR QL CFM: NORMAL
ETHANOL UR QL SCN: ABNORMAL NG/ML
ETHYL GLUCURONIDE UR CFM-MCNC: NORMAL NG/MG CREAT
ETHYL SULFATE UR CFM-MCNC: 7363 NG/MG CREAT
FENTANYL UR QL SCN: NEGATIVE NG/ML
FLUNITRAZEPAM UR QL SCN: NOT DETECTED NG/MG CREAT
FLUOXETINE UR QL SCN: NOT DETECTED
FLUPHENAZINE UR-MCNC: NOT DETECTED NG/ML
FLUVOXAMINE UR QL: NOT DETECTED
GABAPENTIN UR-MCNC: NEGATIVE UG/ML
HALOPERIDOL UR QL: NOT DETECTED
ILOPERIDONE UR QL CFM: NOT DETECTED
IMIPRAMINE UR-MCNC: NOT DETECTED NG/ML
KRATOM IA, UR: NEGATIVE NG/ML
LORAZEPAM/CREAT UR: NOT DETECTED NG/MG CREAT
LOXAPINE UR QL: NOT DETECTED
LURASIDONE UR QL CFM: NOT DETECTED
MAPROTILINE UR QL: NOT DETECTED
ME-PHENIDATE UR QL SCN: NEGATIVE NG/ML
MESORIDAZINE UR QL: NOT DETECTED
METHADONE UR QL SCN: NEGATIVE NG/ML
METHADONE+METAB UR QL SCN: NEGATIVE NG/ML
MIDAZOLAM/CREAT UR CFM: NOT DETECTED NG/MG CREAT
MIRTAZAPINE UR-MCNC: NOT DETECTED UG/ML
MOLINDONE UR QL SCN: NOT DETECTED
NEFAZODONE UR QL: NOT DETECTED
NORCITALOPRAM UR QL: NOT DETECTED
NORCLOMIPRAMINE UR QL: NOT DETECTED
NORCLOZAPINE UR QL: NOT DETECTED
NORDIAZEPAM/CREAT UR: NOT DETECTED NG/MG CREAT
NORDOXEPIN UR QL: NOT DETECTED
NORFLUNITRAZEPAM UR-MCNC: NOT DETECTED NG/MG CREAT
NORFLUOXETINE UR-MCNC: NOT DETECTED NG/ML
NORSERTRALINE UR QL: NOT DETECTED
NORTRIP UR-MCNC: NOT DETECTED NG/ML
ODV UR-MCNC: NOT DETECTED NG/ML
OH-BUPROPION UR-MCNC: NOT DETECTED NG/ML
OLANZAPINE UR CFM-MCNC: NOT DETECTED NG/ML
OPIATES UR SCN-MCNC: NEGATIVE NG/ML
OXAZEPAM/CREAT UR: NOT DETECTED NG/MG CREAT
OXYCODONE CTO UR SCN-MCNC: NEGATIVE NG/ML
PAROXETINE UR-MCNC: NOT DETECTED NG/L
PCP UR QL SCN: NEGATIVE NG/ML
PERPHENAZINE UR QL: NOT DETECTED
PIMOZIDE, UR: NOT DETECTED
PREGABALIN UR QL SCN: NOT DETECTED
PRESCRIBED MEDICATIONS: ABNORMAL
PROCHLORPERAZINE UR QL: NOT DETECTED
PROPOXYPH UR QL SCN: NEGATIVE NG/ML
PROTRIP UR QL: NOT DETECTED
QUETIAPINE CTO UR CFM-MCNC: NOT DETECTED NG/ML
RISPERIDONE UR QL: NOT DETECTED
SERTRALINE UR-MCNC: NOT DETECTED NG/ML
TAPENTADOL CTO UR SCN-MCNC: NEGATIVE NG/ML
TEMAZEPAM/CREAT UR: NOT DETECTED NG/MG CREAT
THIORIDAZINE UR-MCNC: NOT DETECTED UG/ML
THIOTHIXENE UR QL: NOT DETECTED
TRAMADOL UR QL SCN: NEGATIVE NG/ML
TRAZODONE UR QL: NOT DETECTED
TRAZODONE UR-MCNC: NOT DETECTED UG/ML
TRICYCLICS TESTED UR SCN: NEGATIVE
TRIFPERAZINE UR QL: NOT DETECTED
TRIMIPRAMINE UR QL: NOT DETECTED
VENLAFAXINE UR QL: NOT DETECTED
VILAZ UR QL SCN: NOT DETECTED
ZIPRASIDONE UR QL SCN: NOT DETECTED

## 2024-07-24 DIAGNOSIS — F90.2 ATTENTION DEFICIT HYPERACTIVITY DISORDER (ADHD), COMBINED TYPE: ICD-10-CM

## 2024-07-24 RX ORDER — AMPHETAMINE 18.8 MG/1
1 TABLET, ORALLY DISINTEGRATING ORAL 2 TIMES DAILY
Qty: 60 EACH | Refills: 0 | Status: SHIPPED | OUTPATIENT
Start: 2024-07-24

## 2024-07-24 NOTE — TELEPHONE ENCOUNTER
Rx Refill Note  Requested Prescriptions     Pending Prescriptions Disp Refills    Amphetamine ER (adZENys XR-ODT) 18.8 MG Tablet Extended Release Dispersible 60 each 0     Sig: Place 1 each on the tongue 2 (Two) Times a Day.      Last office visit with prescribing clinician: 6/27/2024   Last telemedicine visit with prescribing clinician: Visit date not found   Next office visit with prescribing clinician: 9/19/2024   Office Visit with Hafsa Oneal MD (06/27/2024)   ToxAssure Flex 22, Ur w/DL - Urine, Random Void (06/27/2024 11:49)                     Would you like a call back once the refill request has been completed: [] Yes [] No    If the office needs to give you a call back, can they leave a voicemail: [] Yes [] No    Mimi Ramsey MA  07/24/24, 15:34 EDT    LAST FILL 6-27-24; PLEASE SEND TO [facility]; UPLOADED

## 2024-08-26 DIAGNOSIS — F90.2 ATTENTION DEFICIT HYPERACTIVITY DISORDER (ADHD), COMBINED TYPE: ICD-10-CM

## 2024-08-26 NOTE — TELEPHONE ENCOUNTER
Rx Refill Note  Requested Prescriptions     Pending Prescriptions Disp Refills    Amphetamine ER (adZENys XR-ODT) 18.8 MG Tablet Extended Release Dispersible 60 each 0     Sig: Place 1 each on the tongue 2 (Two) Times a Day.      Last office visit with prescribing clinician: 6/27/2024   Last telemedicine visit with prescribing clinician: Visit date not found   Next office visit with prescribing clinician: 9/19/2024   Office Visit with Hafsa Oneal MD (06/27/2024)   ToxAssure Flex 22, Ur w/DL - Urine, Random Void (06/27/2024 11:49)                     Would you like a call back once the refill request has been completed: [] Yes [] No    If the office needs to give you a call back, can they leave a voicemail: [] Yes [] No    Mimi Ramsey MA  08/26/24, 16:14 EDT    LAST FILL ADZENYS 7-29-24; PLEASE SEND TO [facility] AGA[facility]; UPLOADED

## 2024-08-27 RX ORDER — AMPHETAMINE 18.8 MG/1
1 TABLET, ORALLY DISINTEGRATING ORAL 2 TIMES DAILY
Qty: 60 EACH | Refills: 0 | Status: SHIPPED | OUTPATIENT
Start: 2024-08-27

## 2024-09-26 DIAGNOSIS — F90.2 ATTENTION DEFICIT HYPERACTIVITY DISORDER (ADHD), COMBINED TYPE: ICD-10-CM

## 2024-09-28 RX ORDER — AMPHETAMINE 18.8 MG/1
1 TABLET, ORALLY DISINTEGRATING ORAL 2 TIMES DAILY
Qty: 60 EACH | Refills: 0 | Status: SHIPPED | OUTPATIENT
Start: 2024-09-28

## 2024-10-25 DIAGNOSIS — F90.2 ATTENTION DEFICIT HYPERACTIVITY DISORDER (ADHD), COMBINED TYPE: ICD-10-CM

## 2024-10-25 RX ORDER — AMPHETAMINE 18.8 MG/1
1 TABLET, ORALLY DISINTEGRATING ORAL 2 TIMES DAILY
Qty: 60 EACH | Refills: 0 | Status: SHIPPED | OUTPATIENT
Start: 2024-10-25

## 2024-10-25 NOTE — TELEPHONE ENCOUNTER
Rx Refill Note  Requested Prescriptions     Pending Prescriptions Disp Refills    Amphetamine ER (adZENys XR-ODT) 18.8 MG Tablet Extended Release Dispersible 60 each 0     Sig: Place 1 each on the tongue 2 (Two) Times a Day.      Last office visit with prescribing clinician: 6/27/2024   Last telemedicine visit with prescribing clinician: Visit date not found   Next office visit with prescribing clinician: 10/31/2024   Office Visit with Hafsa Oneal MD (06/27/2024)   ToxAssure Flex 22, Ur w/DL - Urine, Random Void (06/27/2024 11:49)                     Would you like a call back once the refill request has been completed: [] Yes [] No    If the office needs to give you a call back, can they leave a voicemail: [] Yes [] No    Mimi Ramsey MA  10/25/24, 09:01 EDT    LAST FILL 9-29-24; PLEASE SEND TO [facility] AGA[facility]; UPLOADED

## 2024-10-31 ENCOUNTER — OFFICE VISIT (OUTPATIENT)
Dept: PSYCHIATRY | Facility: CLINIC | Age: 34
End: 2024-10-31
Payer: COMMERCIAL

## 2024-10-31 VITALS
HEART RATE: 80 BPM | SYSTOLIC BLOOD PRESSURE: 104 MMHG | WEIGHT: 165 LBS | BODY MASS INDEX: 19.07 KG/M2 | DIASTOLIC BLOOD PRESSURE: 67 MMHG

## 2024-10-31 DIAGNOSIS — F90.2 ATTENTION DEFICIT HYPERACTIVITY DISORDER (ADHD), COMBINED TYPE: Primary | Chronic | ICD-10-CM

## 2024-10-31 NOTE — PROGRESS NOTES
"Subjective   Kan Saavedra is a 34 y.o. male who presents today for follow up       Chief Complaint:   decreased concentration without meds      History of Present Illness:   The patient was diagnosed with ADHD when he was a child, he was stable on medications,  When on medications he is able to stay focused to finish the project   mood is stable, denied feeling hopeless/helpless, denied AVH  No anxiety related to meds    Last visit - no changes in meds    Today the pt reported feeling better, situation at home improved   Overall, mood is stable, denied feeling depressed/hopeless/helpless, denied AVH/SI/hi   Decreased concentration without meds   Depression is rated as 1-2/10,    he is taking care of his family,   works full time    Adzenys is still  effective and less \"ups and downs\", likes smooth transition      when on meds - concentration is better , more organized, more productive , less errors   Sleep - fragmented  but overall sufficient   The pt denied al drug use     The following portions of the patient's history were reviewed and updated as appropriate: allergies, current medications, past family history, past medical history, past social history, past surgical history and problem list.  PAST PSYCHIATRIC HISTORY      Previous Psychiatric Diagnoses   Axis I: Attention Deficit Disorder     Past Hospitalizations or Residential Treatment   Locations\Providers: none      Past Outpatient Treatment   Diagnosis Treated: Affective Disorder, Anxiety/Panic Dis.  Treatment Type: Medication Management  Location: in school - psych      Prior Psychiatric Medications   Comments: ritalin since 2 grade, then taken off due to side effects   concerta - no difference   adderall - was rxd but did nto take it  viibryd - made numb   vyvanse was effective      Consequences of Mental Disorder   Consequences: family disruption, emotional distress     SOCIAL HISTORY   Single  Number of marriages: 0  Number of children: 1  Current " Relationship is: supportive   Family of Origin is: supportive     Current Living Situation   Lives with: children, significant other     Education   Level: some college     Employment   Job Status: full-time     Hobbies and Leisure Activities   Activity Type: quiet activities     Alcohol use   Freq. drinking: <1  Smoking History   Smoking Hx: Current every day smoker  Pack per day: 1     Exercise   Exercise sessions (per wk): 2     Illicit Drug Use   Illicit Drugs used: none     FAMILY HISTORY OF MENTAL DISORDERS   fh Grandparents: Non-contributory  fh Mother: Affective Disorders  fh Father: Non-contributory  fh Siblings: Non-contributory  fh Other: Non-contributory       Interval History  No Change, stable     Side Effects  Denied       Past Medical History:  Past Medical History:   Diagnosis Date    ADHD (attention deficit hyperactivity disorder)        Past Surgical History:  History reviewed. No pertinent surgical history.    Problem List:  Patient Active Problem List   Diagnosis    Attention deficit hyperactivity disorder (ADHD), combined type    Atopic dermatitis in adult    Parotid gland enlargement    Other hydrocele       Allergy:   No Known Allergies     Discontinued Medications:  There are no discontinued medications.          Current Medications:   Current Outpatient Medications   Medication Sig Dispense Refill    Amphetamine ER (adZENys XR-ODT) 18.8 MG Tablet Extended Release Dispersible Place 1 each on the tongue 2 (Two) Times a Day. 60 each 0     No current facility-administered medications for this visit.         Review of Symptoms:    Psychiatric/Behavioral: Negative for agitation, behavioral problems, confusion, decreased concentration, dysphoric mood, hallucinations, self-injury, sleep disturbance and suicidal ideas.   The patient is not depressed, not nervous/anxious and is not hyperactive.        Physical Exam:   Blood pressure 104/67, pulse 80, weight 74.8 kg (165 lb).    Mental Status Exam:    Hygiene:   good  Cooperation:  Cooperative  Eye Contact:  Good  Psychomotor Behavior:  Appropriate  Affect:  Appropriate and Blunted   Mood: euthymic  Hopelessness: Denies  Speech:  Normal  Thought Process:  Goal directed and Linear  Thought Content:  Normal  Suicidal:  None  Homicidal:  None  Hallucinations:  None  Delusion:  None  Memory:  Intact  Orientation:  Person, Place, Time and Situation  Reliability:  good  Insight:  Good  Judgement:  Good  Impulse Control:  Fair  Physical/Medical Issues:  No      MSE from  6/27/24    reviewed and accepted without  changes     PHQ-9 Depression Screening  Little interest or pleasure in doing things? Several days   Feeling down, depressed, or hopeless? Several days   PHQ-2 Total Score 2   Trouble falling or staying asleep, or sleeping too much? Several days   Feeling tired or having little energy? Several days   Poor appetite or overeating? Not at all   Feeling bad about yourself - or that you are a failure or have let yourself or your family down? Several days   Trouble concentrating on things, such as reading the newspaper or watching television? Over half   Moving or speaking so slowly that other people could have noticed? Or the opposite - being so fidgety or restless that you have been moving around a lot more than usual? Not at all   Thoughts that you would be better off dead, or of hurting yourself in some way? Not at all   PHQ-9 Total Score 7   If you checked off any problems, how difficult have these problems made it for you to do your work, take care of things at home, or get along with other people? Somewhat difficult   Over the last two weeks, how often have you been bothered by the following problems?  Feeling nervous, anxious or on edge: More than half the days  Not being able to stop or control worrying: Several days  Worrying too much about different things: More than half the days  Trouble Relaxing: Several days  Being so restless that it is hard to sit  still: Not at all  Becoming easily annoyed or irritable: Not at all  Feeling afraid as if something awful might happen: Several days  JOSE 7 Total Score: 7  If you checked any problems, how difficult have these problems made it for you to do your work, take care of things at home, or get along with other people: Very difficult            Former smoker    I advised Kan of the risks of tobacco use.     Lab Results:   No visits with results within 3 Month(s) from this visit.   Latest known visit with results is:   Office Visit on 06/27/2024   Component Date Value Ref Range Status    Report Summary 06/27/2024 FINAL   Final    Comment: ====================================================================  Ethanol Biomarkers, MS, Ur RFX  Cannabinoids, MS, Ur RFX  ToxAssure Flex 22, Ur w/DL  ====================================================================  Specimen Alert  Note:  Urinary creatinine is very low; ability to detect  some drugs may be compromised; creatinine-normalized  drug concentrations should be interpreted with  caution. Suggest recollection.  ====================================================================  Test                             Result       Flag       Units  Drug Present not Declared for Prescription Verification    Ethyl Glucuronide              96615        UNEXPECTED ng/mg creat    Ethyl Sulfate                  7363         UNEXPECTED ng/mg creat     EtG and EtS are metabolites of ethyl alcohol; EtG may be a     fermentation product of glucose, but EtS is not known to be     formed by fermentation.  Incidental exposure to alcohol may     result in detectable levels of EtG and/or                            EtS.  EtG/EtS results     should be interpreted in the context of all available clinical     and behavioral information.    Carboxy-THC                    1675         UNEXPECTED ng/mg creat     Carboxy-THC is a metabolite of tetrahydrocannabinol (THC). Source     of THC is most  commonly herbal marijuana or marijuana-based     products, but THC is also present in a scheduled prescription     medication. Trace amounts of THC can be present in hemp and     cannabidiol (CBD) products. This test is not intended to     distinguish between delta-9-tetrahydrocannabinol, the predominant     form of THC in most herbal or marijuana-based products, and     delta-8-tetrahydrocannabinol.  Drug Absent but Declared for Prescription Verification    AMPHETAMINES IA                NEGATIVE     UNEXPECTED ng/mL     Presumptive testing by immunoassay was NEGATIVE; definitive     testing was not performed.     Amphetamine is a declared medication; use of this medication     would be expected to result in                            a presumptive positive response.  ====================================================================  Test                      Result    Flag   Units      Ref Range    Creatinine              8         L      mg/dL      >=20  ====================================================================  Declared Medications:   The flagging and interpretation on this report are based on the   following declared medications.  Unexpected results may arise from   inaccuracies in the declared medications.   **Note: The testing scope of this panel includes these medications:   Amphetamine   **Note: The testing scope of this panel does not include following   reported medications:   Cannabidiol  ====================================================================  For clinical consultation, please call [phone].  ====================================================================      CREATININE 06/27/2024 8 (L)  mg/dL Final    Comment: Note:  Urinary creatinine is very low; ability to detect         some drugs may be compromised; creatinine-normalized         drug concentrations should be interpreted with         caution. Suggest recollection.  REFERENCE RANGE: Ref Range>=20       Amphetamines, IA 06/27/2024 Negative  CUTOFF:300 ng/mL Final    Benzodiazepines 06/27/2024 Negative   Final    Diazepam Urine, Qualitative 06/27/2024 Not Detected  ng/mg creat Final    Desmethyldiazepam 06/27/2024 Not Detected  ng/mg creat Final    Oxazepam, urine 06/27/2024 Not Detected  ng/mg creat Final    Temazepam 06/27/2024 Not Detected  ng/mg creat Final    Comment: Expected metabolism of benzodiazepine class drugs:   Parent Drug       Detected Metabolites   -----------       --------------------   Diazepam:         Desmethyldiazepam, Temazepam, Oxazepam   Chlordiazepoxide: Desmethyldiazepam, Oxazepam   Clorazepate:      Desmethyldiazepam, Oxazepam   Halazepam:        Desmethyldiazepam, Oxazepam   Temazepam:        Oxazepam   Oxazepam:         None      Alprazolam Urine, Conf 06/27/2024 Not Detected  ng/mg creat Final    Alpha-hydroxyalprazolam, Urine 06/27/2024 Not Detected  ng/mg creat Final    Desalkylflurazepam, Urine 06/27/2024 Not Detected  ng/mg creat Final    Lorazepam, Urine 06/27/2024 Not Detected  ng/mg creat Final    Alpha-hydroxytriazolam, Urine 06/27/2024 Not Detected  ng/mg creat Final    Clonazepam 06/27/2024 Not Detected  ng/mg creat Final    7- AMINOCLONAZEPAM 06/27/2024 Not Detected  ng/mg creat Final    Midazolam, Urine 06/27/2024 Not Detected  ng/mg creat Final    Alpha-hydroxymidazolam, Urine 06/27/2024 Not Detected  ng/mg creat Final    Flunitrazepam 06/27/2024 Not Detected  ng/mg creat Final    DESMETHYLFLUNITRAZEPAM 06/27/2024 Not Detected  ng/mg creat Final    COCAINE / METABOLITE, IA 06/27/2024 Negative  CUTOFF:150 ng/mL Final    Ethanol and Ethanol Biomarkers 06/27/2024 Comment  CUTOFF:500 ng/mL Final    Further testing indicated    Cannavinoids IA 06/27/2024 Comment  CUTOFF:20 ng/mL Final    Further testing indicated    6-Acetylmorphine IA 06/27/2024 Negative  CUTOFF:10 ng/mL Final    Opiate Class IA 06/27/2024 Negative  CUTOFF:100 ng/mL Final    Oxycodone Class IA 06/27/2024  Negative  CUTOFF:100 ng/mL Final    METHADONE, IA 06/27/2024 Negative  CUTOFF:100 ng/mL Final    Methadone MTB IA 06/27/2024 Negative  CUTOFF:100 ng/mL Final    BUPRENORPHINE IA 06/27/2024 Negative  CUTOFF:5.0 ng/mL Final    FENTANYL, IA 06/27/2024 Negative  CUTOFF:2.0 ng/mL Final    Tapentadol, IA 06/27/2024 Negative  CUTOFF:200 ng/mL Final    PROPOXYPHENE, IA 06/27/2024 Negative  CUTOFF:300 ng/mL Final    TRAMADOL, IA 06/27/2024 Negative  CUTOFF:200 ng/mL Final    METHYLPHENIDATE IA 06/27/2024 Negative  CUTOFF:100 ng/mL Final    Barbiturates, IA 06/27/2024 Negative  CUTOFF:200 ng/mL Final    PHENCYCLIDINE, IA 06/27/2024 Negative  CUTOFF:25 ng/mL Final    ANTICONVULSANTS 06/27/2024 Negative   Final    Pregabalin 06/27/2024 Not Detected   Final    Gabapentin, IA 06/27/2024 Negative  CUTOFF:1.0 ug/mL Final    Carisoprodol, IA 06/27/2024 Negative  CUTOFF:100 ng/mL Final    Antidepressants 06/27/2024 Negative   Final    Amitriptyline 06/27/2024 Not Detected   Final    Amoxapine 06/27/2024 Not Detected   Final    8-Hydroxyamoxapine, Ur 06/27/2024 Not Detected   Final    Bupropion, Ur 06/27/2024 Not Detected   Final    Hydroxybupropion 06/27/2024 Not Detected   Final    Citalopram 06/27/2024 Not Detected   Final    Desmethylcitalopram 06/27/2024 Not Detected   Final    Clomipramine, Ur 06/27/2024 Not Detected   Final    Desmethylclomipramine 06/27/2024 Not Detected   Final    Desipramine 06/27/2024 Not Detected   Final    Doxepin 06/27/2024 Not Detected   Final    Desmethyldoxepin, Ur 06/27/2024 Not Detected   Final    Duloxetine, Ur 06/27/2024 Not Detected   Final    Fluoxetine, Ur 06/27/2024 Not Detected   Final    Norfluoxetine, Ur 06/27/2024 Not Detected   Final    Fluvoxamine 06/27/2024 Not Detected   Final    Imipramine 06/27/2024 Not Detected   Final    Mirtazapine 06/27/2024 Not Detected   Final    Nortriptyline 06/27/2024 Not Detected   Final    Paroxetine 06/27/2024 Not Detected   Final    Protriptyline  06/27/2024 Not Detected   Final    Sertraline, Ur 06/27/2024 Not Detected   Final    Desmethylsertraline 06/27/2024 Not Detected   Final    Maprotiline 06/27/2024 Not Detected   Final    Nefazodone, Ur 06/27/2024 Not Detected   Final    Trazodone 06/27/2024 Not Detected   Final    1,3 chlorophenyl piperazine 06/27/2024 Not Detected   Final    Trimipramine 06/27/2024 Not Detected   Final    Venlafaxine 06/27/2024 Not Detected   Final    Desmethylvenlafaxine, Ur 06/27/2024 Not Detected   Final    Vilazodone, Ur 06/27/2024 Not Detected   Final    Antipsychotics, Ur 06/27/2024 Negative   Final    Chlorpromazine 06/27/2024 Not Detected   Final    Clozapine, Ur 06/27/2024 Not Detected   Final    Desmethylclozapine, Ur 06/27/2024 Not Detected   Final    Loxapine, Ur 06/27/2024 Not Detected   Final    8-Hydroxyloxapine 06/27/2024 Not Detected   Final    Mesoridazine 06/27/2024 Not Detected   Final    Olanzapine 06/27/2024 Not Detected   Final    Quetiapine 06/27/2024 Not Detected   Final    Risperidone 06/27/2024 Not Detected   Final    Fluphenazine 06/27/2024 Not Detected   Final    Haloperidol 06/27/2024 Not Detected   Final    THIORIDAZINE, UR 06/27/2024 Not Detected   Final    Molindone, Ur 06/27/2024 Not Detected   Final    Pimozide, Ur 06/27/2024 Not Detected   Final    Prochlorperazine, Ur 06/27/2024 Not Detected   Final    Thiothixene 06/27/2024 Not Detected   Final    Trifluoperazine 06/27/2024 Not Detected   Final    Ziprasidone 06/27/2024 Not Detected   Final    Perphenazine, Ur 06/27/2024 Not Detected   Final    Aripiprazole 06/27/2024 Not Detected   Final    Asenapine 06/27/2024 Not Detected   Final    Iloperidone 06/27/2024 Not Detected   Final    Lurasidone 06/27/2024 Not Detected   Final    KRATOM IA 06/27/2024 Negative  CUTOFF:5.0 ng/mL Final    Ethanol Biomarkers Confirm 06/27/2024 +POSITIVE+   Final    Ethyl Glucuronide 06/27/2024 36,663  ng/mg creat Final    Ethyl Sulfate 06/27/2024 7,363  ng/mg creat  Final    Cannabinoid Confirmation 06/27/2024 +POSITIVE+   Final    Carboxy THC 06/27/2024 1,675  ng/mg creat Final    Comment: This test is not intended to distinguish between the metabolites of  delta-9-tetrahydrocannabinol, the predominant form of THC in most  herbal or marijuana-based products, and delta-8-tetrahydrocannabinol,  a psychoactive compound generally synthesized from other  cannabinoids.         Assessment & Plan   Problems Addressed this Visit       Attention deficit hyperactivity disorder (ADHD), combined type - Primary (Chronic)     Diagnoses         Codes Comments    Attention deficit hyperactivity disorder (ADHD), combined type    -  Primary ICD-10-CM: F90.2  ICD-9-CM: 314.01             Visit Diagnoses:    ICD-10-CM ICD-9-CM   1. Attention deficit hyperactivity disorder (ADHD), combined type  F90.2 314.01             TREATMENT PLAN/GOALS: Continue supportive psychotherapy efforts and medications as indicated. Treatment and medication options discussed during today's visit. Patient ackowledged and verbally consented to continue with current treatment plan and was educated on the importance of compliance with treatment and follow-up appointments.    MEDICATION ISSUES:  1. ADHD inattentive - Cont Adzenys 18, stable on meds, no side effects , weight stable      The pt was informed about necessity to stay compliant with f/u and drug screens, the pt verbalized understanding, recommended to get baseline EKG in the future, BP is stable       UDS 10/27/2020  Consistent, 3/25/2022 consistent     UDS 8/17/23 - consistent   LABORATORY - SCAN - URINE DRUG SCREEN, [facility], 08/17/2023 (08/17/2023)   UDS 6/27/24 - consistent  -  the pt was off meds for few days evidenced by INSPECT   ToxAssure Flex 22, Ur w/DL - Urine, Random Void (06/27/2024 11:49)           PHQ scored 7  and indicated mild  depression (mainly due to decreased concentration without meds)   JOSE 7 scored 7 - mild anxiety       INSPECT reviewed  as expected adzenys   10/28/24       Patient screened positive for depression based on a PHQ-9 score of 7 on 10/31/2024. Follow-up recommendations include:  supportive therapy, coping skills .        Discussed medication options and treatment plan of prescribed medication as well as the risks, benefits, and side effects including potential falls, possible impaired driving and metabolic adversities among others. Patient is agreeable to call the office with any worsening of symptoms or onset of side effects. Patient is agreeable to call 911 or go to the nearest ER should he/she begin having SI/HI. No medication side effects or related complaints today.     MEDS ORDERED DURING VISIT:  No orders of the defined types were placed in this encounter.       Return in about 4 months (around 2/28/2025).       This document has been electronically signed by Hafsa Oneal MD  October 31, 2024 11:23 EDT

## 2024-11-22 DIAGNOSIS — F90.2 ATTENTION DEFICIT HYPERACTIVITY DISORDER (ADHD), COMBINED TYPE: ICD-10-CM

## 2024-11-22 RX ORDER — AMPHETAMINE 18.8 MG/1
1 TABLET, ORALLY DISINTEGRATING ORAL 2 TIMES DAILY
Qty: 60 EACH | Refills: 0 | Status: SHIPPED | OUTPATIENT
Start: 2024-11-22

## 2024-11-22 NOTE — TELEPHONE ENCOUNTER
Rx Refill Note  Requested Prescriptions     Pending Prescriptions Disp Refills    Amphetamine ER (adZENys XR-ODT) 18.8 MG Tablet Extended Release Dispersible 60 each 0     Sig: Place 1 each on the tongue 2 (Two) Times a Day.      Last office visit with prescribing clinician: 10/31/2024   Last telemedicine visit with prescribing clinician: Visit date not found   Next office visit with prescribing clinician: 2/28/2025   Office Visit with Hafsa Oneal MD (10/31/2024)   ToxAssure Flex 22, Ur w/DL - Urine, Random Void (06/27/2024 11:49)                     Would you like a call back once the refill request has been completed: [] Yes [] No    If the office needs to give you a call back, can they leave a voicemail: [] Yes [] No    Mimi Ramsey MA  11/22/24, 09:19 EST    LAST FILL 10-28-24; PLEASE SEND ADZENYS TO [facility] [place] AGA[place]; UPLOADED

## 2024-12-18 ENCOUNTER — PRIOR AUTHORIZATION (OUTPATIENT)
Dept: PSYCHIATRY | Facility: CLINIC | Age: 34
End: 2024-12-18
Payer: COMMERCIAL

## 2024-12-18 NOTE — TELEPHONE ENCOUNTER
PA for Adzenys XR ODT 18.8 mg dispersible tablets BID submitted to [phone].    Pt tried and failed these meds:    ritalin since 2 grade, then taken off due to side effects   concerta - no difference   adderall - was rxd but did nto take it  viibryd - made numb

## 2024-12-23 DIAGNOSIS — F90.2 ATTENTION DEFICIT HYPERACTIVITY DISORDER (ADHD), COMBINED TYPE: ICD-10-CM

## 2024-12-24 RX ORDER — AMPHETAMINE 18.8 MG/1
1 TABLET, ORALLY DISINTEGRATING ORAL 2 TIMES DAILY
Qty: 60 EACH | Refills: 0 | Status: SHIPPED | OUTPATIENT
Start: 2024-12-24

## 2025-01-23 DIAGNOSIS — F90.2 ATTENTION DEFICIT HYPERACTIVITY DISORDER (ADHD), COMBINED TYPE: ICD-10-CM

## 2025-01-23 RX ORDER — AMPHETAMINE 18.8 MG/1
1 TABLET, ORALLY DISINTEGRATING ORAL 2 TIMES DAILY
Qty: 60 EACH | Refills: 0 | Status: SHIPPED | OUTPATIENT
Start: 2025-01-23

## 2025-01-23 NOTE — TELEPHONE ENCOUNTER
Rx Refill Note  Requested Prescriptions     Pending Prescriptions Disp Refills    Amphetamine ER (adZENys XR-ODT) 18.8 MG Tablet Extended Release Dispersible 60 each 0     Sig: Place 1 each on the tongue 2 (Two) Times a Day.      Last office visit with prescribing clinician: 10/31/2024   Last telemedicine visit with prescribing clinician: Visit date not found   Next office visit with prescribing clinician: 2/28/2025   Office Visit with Hafsa Oneal MD (10/31/2024)   ToxAssure Flex 22, Ur w/DL - Urine, Random Void (06/27/2024 11:49)                     Would you like a call back once the refill request has been completed: [] Yes [] No    If the office needs to give you a call back, can they leave a voicemail: [] Yes [] No    Mimi Ramsey MA  01/23/25, 08:30 EST    LAST FILL 12-26-24; PT WILL WAIT FOR A SHIPMENT AT [facility] [place]; UPLOADED

## 2025-02-20 DIAGNOSIS — F90.2 ATTENTION DEFICIT HYPERACTIVITY DISORDER (ADHD), COMBINED TYPE: ICD-10-CM

## 2025-02-20 RX ORDER — AMPHETAMINE 18.8 MG/1
1 TABLET, ORALLY DISINTEGRATING ORAL 2 TIMES DAILY
Qty: 60 EACH | Refills: 0 | Status: SHIPPED | OUTPATIENT
Start: 2025-02-20

## 2025-03-25 DIAGNOSIS — F90.2 ATTENTION DEFICIT HYPERACTIVITY DISORDER (ADHD), COMBINED TYPE: ICD-10-CM

## 2025-03-25 NOTE — TELEPHONE ENCOUNTER
Rx Refill Note  Requested Prescriptions     Pending Prescriptions Disp Refills    Amphetamine ER (adZENys XR-ODT) 18.8 MG Tablet Extended Release Dispersible 60 each 0     Sig: Place 1 each on the tongue 2 (Two) Times a Day.      Last office visit with prescribing clinician: 10/31/2024   Last telemedicine visit with prescribing clinician: Visit date not found   Next office visit with prescribing clinician: 6/25/2025   Office Visit with Hafsa Oneal MD (10/31/2024)      ToxAssure Flex 22, Ur w/DL - Urine, Random Void (06/27/2024 11:49)                  Would you like a call back once the refill request has been completed: [] Yes [] No    If the office needs to give you a call back, can they leave a voicemail: [] Yes [] No  Last filled 2/22/25  BEHAVIORAL HEALTH - SCAN - [facility] 3/25/25 (03/25/2025)   Libra Jeffers  03/25/25, 14:21 EDT

## 2025-03-26 RX ORDER — AMPHETAMINE 18.8 MG/1
1 TABLET, ORALLY DISINTEGRATING ORAL 2 TIMES DAILY
Qty: 60 EACH | Refills: 0 | Status: SHIPPED | OUTPATIENT
Start: 2025-03-26

## 2025-04-22 DIAGNOSIS — F90.2 ATTENTION DEFICIT HYPERACTIVITY DISORDER (ADHD), COMBINED TYPE: ICD-10-CM

## 2025-04-22 NOTE — TELEPHONE ENCOUNTER
Rx Refill Note  Requested Prescriptions     Pending Prescriptions Disp Refills    Amphetamine ER (adZENys XR-ODT) 18.8 MG Tablet Extended Release Dispersible 60 each 0     Sig: Place 1 each on the tongue 2 (Two) Times a Day.        Last office visit with prescribing clinician: 10/31/2024     Next office visit with prescribing clinician: 6/25/2025     Office Visit with Hafsa Oneal MD (10/31/2024)     ToxAssure Flex 22, Ur w/DL - Urine, Random Void (06/27/2024 11:49)     BEHAVIORAL HEALTH - SCAN - Inspecxt report, Josephine, 4/22/25 (04/22/2025)     Inspect Fill 3/26/25    Ankita Jean Baptiste  04/22/25, 15:31 EDT

## 2025-04-23 RX ORDER — AMPHETAMINE 18.8 MG/1
1 TABLET, ORALLY DISINTEGRATING ORAL 2 TIMES DAILY
Qty: 60 EACH | Refills: 0 | Status: SHIPPED | OUTPATIENT
Start: 2025-04-23

## 2025-05-16 NOTE — TELEPHONE ENCOUNTER
Called pt for random UDS 5-13-24 @1:28pm and 5-20-24 @10:01am and M and sent mychart; no returned phone calls.   He called with a new pharmacy  [address]  [place]

## 2025-05-22 DIAGNOSIS — F90.2 ATTENTION DEFICIT HYPERACTIVITY DISORDER (ADHD), COMBINED TYPE: ICD-10-CM

## 2025-05-22 RX ORDER — AMPHETAMINE 18.8 MG/1
1 TABLET, ORALLY DISINTEGRATING ORAL 2 TIMES DAILY
Qty: 60 EACH | Refills: 0 | Status: SHIPPED | OUTPATIENT
Start: 2025-05-22

## 2025-05-22 NOTE — TELEPHONE ENCOUNTER
[id number] Refill Note  Requested Prescriptions     Pending Prescriptions Disp Refills    Amphetamine ER (adZENys XR-ODT) 18.8 MG Tablet Extended Release Dispersible 60 each 0     Sig: Place 1 each on the tongue 2 (Two) Times a Day.        Last office visit with prescribing clinician: 10/31/2024     Next office visit with prescribing clinician: 6/25/2025     Office Visit with Hafsa Oneal MD (10/31/2024)     ToxAssure Flex 22, Ur w/DL - Urine, Random Void (06/27/2024 11:49)     BEHAVIORAL HEALTH - SCAN - Inspect report, [facility], 5/22/25 (05/22/2025)     Inspect Fill 4/25/25    Ankita Jean Baptiste  05/22/25, 14:19 EDT

## 2025-06-25 ENCOUNTER — OFFICE VISIT (OUTPATIENT)
Dept: PSYCHIATRY | Facility: CLINIC | Age: 35
End: 2025-06-25
Payer: COMMERCIAL

## 2025-06-25 VITALS
DIASTOLIC BLOOD PRESSURE: 74 MMHG | HEART RATE: 76 BPM | OXYGEN SATURATION: 100 % | BODY MASS INDEX: 19.14 KG/M2 | SYSTOLIC BLOOD PRESSURE: 110 MMHG | WEIGHT: 165.6 LBS

## 2025-06-25 DIAGNOSIS — F90.2 ATTENTION DEFICIT HYPERACTIVITY DISORDER (ADHD), COMBINED TYPE: Primary | Chronic | ICD-10-CM

## 2025-06-25 DIAGNOSIS — Z79.899 ENCOUNTER FOR LONG-TERM (CURRENT) USE OF MEDICATIONS: ICD-10-CM

## 2025-06-25 RX ORDER — AMPHETAMINE 18.8 MG/1
1 TABLET, ORALLY DISINTEGRATING ORAL 2 TIMES DAILY
Qty: 60 EACH | Refills: 0 | Status: SHIPPED | OUTPATIENT
Start: 2025-06-25

## 2025-06-25 NOTE — PROGRESS NOTES
"Subjective   Kan Saavedra is a 35 y.o. male who presents today for follow up       Chief Complaint:   decreased concentration without meds, some frustration and tension at work       History of Present Illness:   The patient was diagnosed with ADHD when he was a child, he was stable on medications,  When on medications he is able to stay focused to finish the project   mood is stable, denied feeling hopeless/helpless, denied AVH  No anxiety related to meds    Last visit - no changes in meds    Today the pt reported feeling overwhelmed with life events, one car, issues with transportation, some issues at work , trying to stabilize his current position   Raising 10 yo son , he feels stretched too thin  Depression 3-4/10 , but  denied feeling depressed/hopeless/helpless, denied AVH/SI/hi   Decreased concentration without meds   Adzenys is still  effective and less \"ups and downs\", likes smooth transition and lasts long enough to complete his work       when on meds - concentration is better , more organized, more productive , less errors   Sleep - fragmented  but overall sufficient   The pt denied al drug use     The following portions of the patient's history were reviewed and updated as appropriate: allergies, current medications, past family history, past medical history, past social history, past surgical history and problem list.  PAST PSYCHIATRIC HISTORY      Previous Psychiatric Diagnoses   Axis I: Attention Deficit Disorder     Past Hospitalizations or Residential Treatment   Locations\Providers: none      Past Outpatient Treatment   Diagnosis Treated: Affective Disorder, Anxiety/Panic Dis.  Treatment Type: Medication Management  Location: in school - psych      Prior Psychiatric Medications   Comments: ritalin since 2 grade, then taken off due to side effects   concerta - no difference   adderall - was rxd but did nto take it  viibryd - made numb   vyvanse was effective      Consequences of Mental Disorder "   Consequences: family disruption, emotional distress     SOCIAL HISTORY   Single  Number of marriages: 0  Number of children: 1  Current Relationship is: supportive   Family of Origin is: supportive     Current Living Situation   Lives with: children, significant other     Education   Level: some college     Employment   Job Status: full-time     Hobbies and Leisure Activities   Activity Type: quiet activities     Alcohol use   Freq. drinking: <1  Smoking History   Smoking Hx: Current every day smoker  Pack per day: 1     Exercise   Exercise sessions (per wk): 2     Illicit Drug Use   Illicit Drugs used: none     FAMILY HISTORY OF MENTAL DISORDERS   fh Grandparents: Non-contributory  fh Mother: Affective Disorders  fh Father: Non-contributory  fh Siblings: Non-contributory  fh Other: Non-contributory       Interval History  No Change, stable     Side Effects  Denied       Past Medical History:  Past Medical History:   Diagnosis Date    ADHD (attention deficit hyperactivity disorder)        Past Surgical History:  History reviewed. No pertinent surgical history.    Problem List:  Patient Active Problem List   Diagnosis    Attention deficit hyperactivity disorder (ADHD), combined type    Atopic dermatitis in adult    Parotid gland enlargement    Other hydrocele       Allergy:   No Known Allergies     Discontinued Medications:  Medications Discontinued During This Encounter   Medication Reason    Amphetamine ER (adZENys XR-ODT) 18.8 MG Tablet Extended Release Dispersible Reorder             Current Medications:   Current Outpatient Medications   Medication Sig Dispense Refill    Amphetamine ER (adZENys XR-ODT) 18.8 MG Tablet Extended Release Dispersible Place 1 each on the tongue 2 (Two) Times a Day. 60 each 0     No current facility-administered medications for this visit.         Review of Symptoms:    Psychiatric/Behavioral: Negative for agitation, behavioral problems, confusion, decreased concentration, dysphoric  mood, hallucinations, self-injury, sleep disturbance and suicidal ideas.   The patient is  depressed,  nervous and frustrated     Physical Exam:   There were no vitals taken for this visit.    Mental Status Exam:   Hygiene:   good  Cooperation:  Cooperative  Eye Contact:  Good  Psychomotor Behavior:  Appropriate  Affect:  Appropriate and Blunted   Mood: euthymic and fluctates  Hopelessness: Denies  Speech:  Normal  Thought Process:  Goal directed and Linear  Thought Content:  Normal  Suicidal:  None  Homicidal:  None  Hallucinations:  None  Delusion:  None  Memory:  Intact  Orientation:  Person, Place, Time and Situation  Reliability:  good  Insight:  Good  Judgement:  Good  Impulse Control:  Fair  Physical/Medical Issues:  No      MSE from  10/31/24    reviewed and accepted with  changes     PHQ-9 Depression Screening  Little interest or pleasure in doing things? Several days   Feeling down, depressed, or hopeless? Several days   PHQ-2 Total Score 2   Trouble falling or staying asleep, or sleeping too much? Not at all   Feeling tired or having little energy? Over half   Poor appetite or overeating? Several days   Feeling bad about yourself - or that you are a failure or have let yourself or your family down? Over half   Trouble concentrating on things, such as reading the newspaper or watching television? Over half   Moving or speaking so slowly that other people could have noticed? Or the opposite - being so fidgety or restless that you have been moving around a lot more than usual? Not at all   Thoughts that you would be better off dead, or of hurting yourself in some way? Not at all   PHQ-9 Total Score 9   If you checked off any problems, how difficult have these problems made it for you to do your work, take care of things at home, or get along with other people? Very difficult   Over the last two weeks, how often have you been bothered by the following problems?  Feeling nervous, anxious or on edge: More than  half the days  Not being able to stop or control worrying: More than half the days  Worrying too much about different things: Several days  Trouble Relaxing: Several days  Being so restless that it is hard to sit still: Not at all  Becoming easily annoyed or irritable: Several days  Feeling afraid as if something awful might happen: More than half the days  JOSE 7 Total Score: 9  If you checked any problems, how difficult have these problems made it for you to do your work, take care of things at home, or get along with other people: Very difficult            Former smoker    I advised Kan of the risks of tobacco use.     Lab Results:   No visits with results within 3 Month(s) from this visit.   Latest known visit with results is:   Office Visit on 06/27/2024   Component Date Value Ref Range Status    Report Summary 06/27/2024 FINAL   Final    Comment: ====================================================================  Ethanol Biomarkers, MS, Ur RFX  Cannabinoids, MS, Ur RFX  ToxAssure Flex 22, Ur w/DL  ====================================================================  Specimen Alert  Note:  Urinary creatinine is very low; ability to detect  some drugs may be compromised; creatinine-normalized  drug concentrations should be interpreted with  caution. Suggest recollection.  ====================================================================  Test                             Result       Flag       Units  Drug Present not Declared for Prescription Verification    Ethyl Glucuronide              48424        UNEXPECTED ng/mg creat    Ethyl Sulfate                  7363         UNEXPECTED ng/mg creat     EtG and EtS are metabolites of ethyl alcohol; EtG may be a     fermentation product of glucose, but EtS is not known to be     formed by fermentation.  Incidental exposure to alcohol may     result in detectable levels of EtG and/or                            EtS.  EtG/EtS results     should be interpreted in  the context of all available clinical     and behavioral information.    Carboxy-THC                    1675         UNEXPECTED ng/mg creat     Carboxy-THC is a metabolite of tetrahydrocannabinol (THC). Source     of THC is most commonly herbal marijuana or marijuana-based     products, but THC is also present in a scheduled prescription     medication. Trace amounts of THC can be present in hemp and     cannabidiol (CBD) products. This test is not intended to     distinguish between delta-9-tetrahydrocannabinol, the predominant     form of THC in most herbal or marijuana-based products, and     delta-8-tetrahydrocannabinol.  Drug Absent but Declared for Prescription Verification    AMPHETAMINES IA                NEGATIVE     UNEXPECTED ng/mL     Presumptive testing by immunoassay was NEGATIVE; definitive     testing was not performed.     Amphetamine is a declared medication; use of this medication     would be expected to result in                            a presumptive positive response.  ====================================================================  Test                      Result    Flag   Units      Ref Range    Creatinine              8         L      mg/dL      >=20  ====================================================================  Declared Medications:   The flagging and interpretation on this report are based on the   following declared medications.  Unexpected results may arise from   inaccuracies in the declared medications.   **Note: The testing scope of this panel includes these medications:   Amphetamine   **Note: The testing scope of this panel does not include following   reported medications:   Cannabidiol  ====================================================================  For clinical consultation, please call [phone].  ====================================================================      CREATININE 06/27/2024 8 (L)  mg/dL Final    Comment: Note:  Urinary creatinine is  very low; ability to detect         some drugs may be compromised; creatinine-normalized         drug concentrations should be interpreted with         caution. Suggest recollection.  REFERENCE RANGE: Ref Range>=20      Amphetamines, IA 06/27/2024 Negative  CUTOFF:300 ng/mL Final    Benzodiazepines 06/27/2024 Negative   Final    Diazepam Urine, Qualitative 06/27/2024 Not Detected  ng/mg creat Final    Desmethyldiazepam 06/27/2024 Not Detected  ng/mg creat Final    Oxazepam, urine 06/27/2024 Not Detected  ng/mg creat Final    Temazepam 06/27/2024 Not Detected  ng/mg creat Final    Comment: Expected metabolism of benzodiazepine class drugs:   Parent Drug       Detected Metabolites   -----------       --------------------   Diazepam:         Desmethyldiazepam, Temazepam, Oxazepam   Chlordiazepoxide: Desmethyldiazepam, Oxazepam   Clorazepate:      Desmethyldiazepam, Oxazepam   Halazepam:        Desmethyldiazepam, Oxazepam   Temazepam:        Oxazepam   Oxazepam:         None      Alprazolam Urine, Conf 06/27/2024 Not Detected  ng/mg creat Final    Alpha-hydroxyalprazolam, Urine 06/27/2024 Not Detected  ng/mg creat Final    Desalkylflurazepam, Urine 06/27/2024 Not Detected  ng/mg creat Final    Lorazepam, Urine 06/27/2024 Not Detected  ng/mg creat Final    Alpha-hydroxytriazolam, Urine 06/27/2024 Not Detected  ng/mg creat Final    Clonazepam 06/27/2024 Not Detected  ng/mg creat Final    7- AMINOCLONAZEPAM 06/27/2024 Not Detected  ng/mg creat Final    Midazolam, Urine 06/27/2024 Not Detected  ng/mg creat Final    Alpha-hydroxymidazolam, Urine 06/27/2024 Not Detected  ng/mg creat Final    Flunitrazepam 06/27/2024 Not Detected  ng/mg creat Final    DESMETHYLFLUNITRAZEPAM 06/27/2024 Not Detected  ng/mg creat Final    COCAINE / METABOLITE, IA 06/27/2024 Negative  CUTOFF:150 ng/mL Final    Ethanol and Ethanol Biomarkers 06/27/2024 Comment  CUTOFF:500 ng/mL Final    Further testing indicated    Cannavinoids IA 06/27/2024  Comment  CUTOFF:20 ng/mL Final    Further testing indicated    6-Acetylmorphine IA 06/27/2024 Negative  CUTOFF:10 ng/mL Final    Opiate Class IA 06/27/2024 Negative  CUTOFF:100 ng/mL Final    Oxycodone Class IA 06/27/2024 Negative  CUTOFF:100 ng/mL Final    METHADONE, IA 06/27/2024 Negative  CUTOFF:100 ng/mL Final    Methadone MTB IA 06/27/2024 Negative  CUTOFF:100 ng/mL Final    BUPRENORPHINE IA 06/27/2024 Negative  CUTOFF:5.0 ng/mL Final    FENTANYL, IA 06/27/2024 Negative  CUTOFF:2.0 ng/mL Final    Tapentadol, IA 06/27/2024 Negative  CUTOFF:200 ng/mL Final    PROPOXYPHENE, IA 06/27/2024 Negative  CUTOFF:300 ng/mL Final    TRAMADOL, IA 06/27/2024 Negative  CUTOFF:200 ng/mL Final    METHYLPHENIDATE IA 06/27/2024 Negative  CUTOFF:100 ng/mL Final    Barbiturates, IA 06/27/2024 Negative  CUTOFF:200 ng/mL Final    PHENCYCLIDINE, IA 06/27/2024 Negative  CUTOFF:25 ng/mL Final    ANTICONVULSANTS 06/27/2024 Negative   Final    Pregabalin 06/27/2024 Not Detected   Final    Gabapentin, IA 06/27/2024 Negative  CUTOFF:1.0 ug/mL Final    Carisoprodol, IA 06/27/2024 Negative  CUTOFF:100 ng/mL Final    Antidepressants 06/27/2024 Negative   Final    Amitriptyline 06/27/2024 Not Detected   Final    Amoxapine 06/27/2024 Not Detected   Final    8-Hydroxyamoxapine, Ur 06/27/2024 Not Detected   Final    Bupropion, Ur 06/27/2024 Not Detected   Final    Hydroxybupropion 06/27/2024 Not Detected   Final    Citalopram 06/27/2024 Not Detected   Final    Desmethylcitalopram 06/27/2024 Not Detected   Final    Clomipramine, Ur 06/27/2024 Not Detected   Final    Desmethylclomipramine 06/27/2024 Not Detected   Final    Desipramine 06/27/2024 Not Detected   Final    Doxepin 06/27/2024 Not Detected   Final    Desmethyldoxepin, Ur 06/27/2024 Not Detected   Final    Duloxetine, Ur 06/27/2024 Not Detected   Final    Fluoxetine, Ur 06/27/2024 Not Detected   Final    Norfluoxetine, Ur 06/27/2024 Not Detected   Final    Fluvoxamine 06/27/2024 Not  Detected   Final    Imipramine 06/27/2024 Not Detected   Final    Mirtazapine 06/27/2024 Not Detected   Final    Nortriptyline 06/27/2024 Not Detected   Final    Paroxetine 06/27/2024 Not Detected   Final    Protriptyline 06/27/2024 Not Detected   Final    Sertraline, Ur 06/27/2024 Not Detected   Final    Desmethylsertraline 06/27/2024 Not Detected   Final    Maprotiline 06/27/2024 Not Detected   Final    Nefazodone, Ur 06/27/2024 Not Detected   Final    Trazodone 06/27/2024 Not Detected   Final    1,3 chlorophenyl piperazine 06/27/2024 Not Detected   Final    Trimipramine 06/27/2024 Not Detected   Final    Venlafaxine 06/27/2024 Not Detected   Final    Desmethylvenlafaxine, Ur 06/27/2024 Not Detected   Final    Vilazodone, Ur 06/27/2024 Not Detected   Final    Antipsychotics, Ur 06/27/2024 Negative   Final    Chlorpromazine 06/27/2024 Not Detected   Final    Clozapine, Ur 06/27/2024 Not Detected   Final    Desmethylclozapine, Ur 06/27/2024 Not Detected   Final    Loxapine, Ur 06/27/2024 Not Detected   Final    8-Hydroxyloxapine 06/27/2024 Not Detected   Final    Mesoridazine 06/27/2024 Not Detected   Final    Olanzapine 06/27/2024 Not Detected   Final    Quetiapine 06/27/2024 Not Detected   Final    Risperidone 06/27/2024 Not Detected   Final    Fluphenazine 06/27/2024 Not Detected   Final    Haloperidol 06/27/2024 Not Detected   Final    THIORIDAZINE, UR 06/27/2024 Not Detected   Final    Molindone, Ur 06/27/2024 Not Detected   Final    Pimozide, Ur 06/27/2024 Not Detected   Final    Prochlorperazine, Ur 06/27/2024 Not Detected   Final    Thiothixene 06/27/2024 Not Detected   Final    Trifluoperazine 06/27/2024 Not Detected   Final    Ziprasidone 06/27/2024 Not Detected   Final    Perphenazine, Ur 06/27/2024 Not Detected   Final    Aripiprazole 06/27/2024 Not Detected   Final    Asenapine 06/27/2024 Not Detected   Final    Iloperidone 06/27/2024 Not Detected   Final    Lurasidone 06/27/2024 Not Detected   Final     KRATOM IA 06/27/2024 Negative  CUTOFF:5.0 ng/mL Final    Ethanol Biomarkers Confirm 06/27/2024 +POSITIVE+   Final    Ethyl Glucuronide 06/27/2024 36,663  ng/mg creat Final    Ethyl Sulfate 06/27/2024 7,363  ng/mg creat Final    Cannabinoid Confirmation 06/27/2024 +POSITIVE+   Final    Carboxy THC 06/27/2024 1,675  ng/mg creat Final    Comment: This test is not intended to distinguish between the metabolites of  delta-9-tetrahydrocannabinol, the predominant form of THC in most  herbal or marijuana-based products, and delta-8-tetrahydrocannabinol,  a psychoactive compound generally synthesized from other  cannabinoids.         Assessment & Plan   Problems Addressed this Visit       Attention deficit hyperactivity disorder (ADHD), combined type - Primary (Chronic)    Relevant Medications    Amphetamine ER (adZENys XR-ODT) 18.8 MG Tablet Extended Release Dispersible    Other Relevant Orders    ToxAssure Flex 22, Ur w/DL -     Other Visit Diagnoses         Encounter for long-term (current) use of medications        Relevant Orders    ToxAssure Flex 22, Ur w/DL -          Diagnoses         Codes Comments      Attention deficit hyperactivity disorder (ADHD), combined type    -  Primary ICD-10-CM: F90.2  ICD-9-CM: 314.01       Encounter for long-term (current) use of medications     ICD-10-CM: Z79.899  ICD-9-CM: V58.69             Visit Diagnoses:    ICD-10-CM ICD-9-CM   1. Attention deficit hyperactivity disorder (ADHD), combined type  F90.2 314.01   2. Encounter for long-term (current) use of medications  Z79.899 V58.69           TREATMENT PLAN/GOALS: Continue supportive psychotherapy efforts and medications as indicated. Treatment and medication options discussed during today's visit. Patient ackowledged and verbally consented to continue with current treatment plan and was educated on the importance of compliance with treatment and follow-up appointments.    MEDICATION ISSUES:  1. ADHD inattentive - Cont Adzenys 18,  stable on meds, no side effects , weight stable      The pt was informed about necessity to stay compliant with f/u and drug screens, the pt verbalized understanding, recommended to get baseline EKG in the future, BP is stable       For depression and anxiety - recommended psychotherapy, the pt will schedule appt with his wife's therapist     UDS 10/27/2020  Consistent, 3/25/2022 consistent     UDS 8/17/23 - consistent   LABORATORY - SCAN - URINE DRUG SCREEN, [facility], 08/17/2023 (08/17/2023)   UDS 6/27/24 - consistent  -  the pt was off meds for few days evidenced by INSPECT   ToxAssure Flex 22, Ur w/DL - Urine, Random Void (06/27/2024 11:49)   Will repeat today , the pt stated he did not take adzenys today yet         PHQ scored 9  and indicated mild  depression (mainly due to decreased concentration without meds)   JOSE 7 scored 9 - mild anxiety       INSPECT reviewed as expected adzenys # 60   5/25/25      Patient screened positive for depression based on a PHQ-9 score of 9 on 6/25/2025. Follow-up recommendations include: supportive therapy, coping skills.        Discussed medication options and treatment plan of prescribed medication as well as the risks, benefits, and side effects including potential falls, possible impaired driving and metabolic adversities among others. Patient is agreeable to call the office with any worsening of symptoms or onset of side effects. Patient is agreeable to call 911 or go to the nearest ER should he/she begin having SI/HI. No medication side effects or related complaints today.     MEDS ORDERED DURING VISIT:  New Medications Ordered This Visit   Medications    Amphetamine ER (adZENys XR-ODT) 18.8 MG Tablet Extended Release Dispersible     Sig: Place 1 each on the tongue 2 (Two) Times a Day.     Dispense:  60 each     Refill:  0        Return in about 4 months (around 10/25/2025).       This document has been electronically signed by Hafsa Oneal MD  June 25, 2025 11:33  EDT

## 2025-06-29 LAB
1OH-MIDAZOLAM UR QL SCN: NOT DETECTED NG/MG CREAT
6MAM UR QL SCN: NEGATIVE NG/ML
7AMINOCLONAZEPAM/CREAT UR: NOT DETECTED NG/MG CREAT
8OH-AMOXAPINE UR QL: NOT DETECTED
8OH-LOXAPINE UR QL SCN: NOT DETECTED
A-OH ALPRAZ/CREAT UR: NOT DETECTED NG/MG CREAT
A-OH-TRIAZOLAM/CREAT UR CFM: NOT DETECTED NG/MG CREAT
ALPRAZ/CREAT UR CFM: NOT DETECTED NG/MG CREAT
AMITRIP UR-MCNC: NOT DETECTED NG/ML
AMOXAPINE UR QL: NOT DETECTED
AMPHET UR CFM-MCNC: NORMAL NG/MG CREAT
AMPHETAMINES UR QL SCN>500 NG/ML: ABNORMAL NG/ML
AMPHETAMINES UR QL: NORMAL
ANTICONVULSANTS UR: NEGATIVE
ANTIPSYCHOTICS UR: NEGATIVE
ARIPIPRAZOLE UR QL SCN: NOT DETECTED
ASENAPINE UR QL CFM: NOT DETECTED
BARBITURATES UR QL SCN: NEGATIVE NG/ML
BENZODIAZ SCN METH UR: NEGATIVE
BUPRENORPHINE UR QL SCN: NEGATIVE NG/ML
BUPROPION UR QL: NOT DETECTED
CANNABINOIDS UR QL CFM: NORMAL
CANNABINOIDS UR QL SCN: ABNORMAL NG/ML
CARBOXYTHC UR CFM-MCNC: 185 NG/MG CREAT
CARISOPRODOL UR QL: NEGATIVE NG/ML
CHLORPROMAZINE UR QL: NOT DETECTED
CITALOPRAM UR LC/MS/MS-MCNC: NOT DETECTED NG/ML
CLOMIPRAMINE UR-MCNC: NOT DETECTED NG/ML
CLONAZEPAM/CREAT UR CFM: NOT DETECTED NG/MG CREAT
CLOZAPINE UR QL: NOT DETECTED
COCAINE+BZE UR QL SCN: NEGATIVE NG/ML
CREAT UR-MCNC: 13 MG/DL
DESALKYLFLURAZ/CREAT UR: NOT DETECTED NG/MG CREAT
DESIPRAMINE UR-MCNC: NOT DETECTED NG/ML
DIAZEPAM/CREAT UR: NOT DETECTED NG/MG CREAT
DOXEPIN UR-MCNC: NOT DETECTED NG/ML
DULOXETINE UR QL: NOT DETECTED
ETG ETS UR QL CFM: NORMAL
ETHANOL UR QL SCN: ABNORMAL NG/ML
ETHYL GLUCURONIDE UR CFM-MCNC: NORMAL NG/MG CREAT
ETHYL SULFATE UR CFM-MCNC: 6946 NG/MG CREAT
FENTANYL UR QL SCN: NEGATIVE NG/ML
FLUNITRAZEPAM UR QL SCN: NOT DETECTED NG/MG CREAT
FLUOXETINE UR QL SCN: NOT DETECTED
FLUPHENAZINE UR-MCNC: NOT DETECTED NG/ML
FLUVOXAMINE UR QL: NOT DETECTED
GABAPENTIN UR-MCNC: NEGATIVE UG/ML
HALOPERIDOL UR QL: NOT DETECTED
ILOPERIDONE UR QL CFM: NOT DETECTED
IMIPRAMINE UR-MCNC: NOT DETECTED NG/ML
LORAZEPAM/CREAT UR: NOT DETECTED NG/MG CREAT
LOXAPINE UR QL: NOT DETECTED
LURASIDONE UR QL CFM: NOT DETECTED
MAPROTILINE UR QL: NOT DETECTED
MDA UR CFM-MCNC: NOT DETECTED NG/MG CREAT
MDMA UR CFM-MCNC: NOT DETECTED NG/MG CREAT
ME-PHENIDATE UR QL SCN: NEGATIVE NG/ML
MESORIDAZINE UR QL: NOT DETECTED
METHADONE UR QL SCN: NEGATIVE NG/ML
METHADONE+METAB UR QL SCN: NEGATIVE NG/ML
METHAMPHET UR CFM-MCNC: NOT DETECTED NG/MG CREAT
MIDAZOLAM/CREAT UR CFM: NOT DETECTED NG/MG CREAT
MIRTAZAPINE UR-MCNC: NOT DETECTED UG/ML
MITRAGYNINE UR QL SCN: NEGATIVE NG/ML
MOLINDONE UR QL SCN: NOT DETECTED
NEFAZODONE UR QL: NOT DETECTED
NORCITALOPRAM UR QL: NOT DETECTED
NORCLOMIPRAMINE UR QL: NOT DETECTED
NORCLOZAPINE UR QL: NOT DETECTED
NORDIAZEPAM/CREAT UR: NOT DETECTED NG/MG CREAT
NORDOXEPIN UR QL: NOT DETECTED
NORFLUNITRAZEPAM UR-MCNC: NOT DETECTED NG/MG CREAT
NORFLUOXETINE UR-MCNC: NOT DETECTED NG/ML
NORSERTRALINE UR QL: NOT DETECTED
NORTRIP UR-MCNC: NOT DETECTED NG/ML
ODV UR-MCNC: NOT DETECTED NG/ML
OH-BUPROPION UR-MCNC: NOT DETECTED NG/ML
OLANZAPINE UR CFM-MCNC: NOT DETECTED NG/ML
OPIATES UR SCN-MCNC: NEGATIVE NG/ML
OXAZEPAM/CREAT UR: NOT DETECTED NG/MG CREAT
OXYCODONE CTO UR SCN-MCNC: NEGATIVE NG/ML
PAROXETINE UR-MCNC: NOT DETECTED NG/L
PCP UR QL SCN: NEGATIVE NG/ML
PERPHENAZINE UR QL: NOT DETECTED
PIMOZIDE, UR: NOT DETECTED
PREGABALIN UR QL SCN: NOT DETECTED
PRESCRIBED MEDICATIONS: ABNORMAL
PROCHLORPERAZINE UR QL: NOT DETECTED
PROPOXYPH UR QL SCN: NEGATIVE NG/ML
PROTRIP UR QL: NOT DETECTED
QUETIAPINE CTO UR CFM-MCNC: NOT DETECTED NG/ML
RISPERIDONE UR QL: NOT DETECTED
SERTRALINE UR-MCNC: NOT DETECTED NG/ML
TAPENTADOL CTO UR SCN-MCNC: NEGATIVE NG/ML
TEMAZEPAM/CREAT UR: NOT DETECTED NG/MG CREAT
THIORIDAZINE UR-MCNC: NOT DETECTED UG/ML
THIOTHIXENE UR QL: NOT DETECTED
TRAMADOL UR QL SCN: NEGATIVE NG/ML
TRAZODONE UR QL: NOT DETECTED
TRAZODONE UR-MCNC: NOT DETECTED UG/ML
TRICYCLICS TESTED UR SCN: NEGATIVE
TRIFPERAZINE UR QL: NOT DETECTED
TRIMIPRAMINE UR QL: NOT DETECTED
VENLAFAXINE UR QL: NOT DETECTED
VILAZ UR QL SCN: NOT DETECTED
ZIPRASIDONE UR QL SCN: NOT DETECTED

## 2025-07-23 DIAGNOSIS — F90.2 ATTENTION DEFICIT HYPERACTIVITY DISORDER (ADHD), COMBINED TYPE: Chronic | ICD-10-CM

## 2025-07-23 RX ORDER — AMPHETAMINE 18.8 MG/1
1 TABLET, ORALLY DISINTEGRATING ORAL 2 TIMES DAILY
Qty: 60 EACH | Refills: 0 | Status: SHIPPED | OUTPATIENT
Start: 2025-07-23

## 2025-07-23 NOTE — TELEPHONE ENCOUNTER
Rx Refill Note  Requested Prescriptions     Pending Prescriptions Disp Refills    Amphetamine ER (adZENys XR-ODT) 18.8 MG Tablet Extended Release Dispersible 60 each 0     Sig: Place 1 each on the tongue 2 (Two) Times a Day.      Last office visit with prescribing clinician: 6/25/2025     Next office visit with prescribing clinician: 10/29/2025     Office Visit with Hafsa Oneal MD (06/25/2025)     Cannabinoids, MS, Ur RFX - (06/25/2025 00:00)  Ethanol Biomarkers, MS, Ur RFX - (06/25/2025 00:00)  Amphetamines, MS, Ur RFX - (06/25/2025 00:00)  ToxAssure Flex 22, Ur w/DL - Urine, Clean Catch (06/25/2025 00:00)    BEHAVIORAL HEALTH - SCAN - Inpsect, 7-, asya (07/23/2025)     Last Inspect adzeny's XR-ODT 18.8 mg filled & sold 6-, QTY 60, days 30    Dahiana Ruano MA  07/23/25, 13:48 EDT

## 2025-08-21 DIAGNOSIS — F90.2 ATTENTION DEFICIT HYPERACTIVITY DISORDER (ADHD), COMBINED TYPE: Chronic | ICD-10-CM

## 2025-08-21 RX ORDER — AMPHETAMINE 18.8 MG/1
1 TABLET, ORALLY DISINTEGRATING ORAL 2 TIMES DAILY
Qty: 60 EACH | Refills: 0 | Status: SHIPPED | OUTPATIENT
Start: 2025-08-21